# Patient Record
Sex: MALE | Race: BLACK OR AFRICAN AMERICAN | NOT HISPANIC OR LATINO | URBAN - METROPOLITAN AREA
[De-identification: names, ages, dates, MRNs, and addresses within clinical notes are randomized per-mention and may not be internally consistent; named-entity substitution may affect disease eponyms.]

---

## 2018-05-11 ENCOUNTER — OFFICE (AMBULATORY)
Dept: URBAN - METROPOLITAN AREA CLINIC 75 | Facility: CLINIC | Age: 53
End: 2018-05-11
Payer: COMMERCIAL

## 2018-05-11 VITALS
SYSTOLIC BLOOD PRESSURE: 142 MMHG | DIASTOLIC BLOOD PRESSURE: 82 MMHG | WEIGHT: 173 LBS | HEIGHT: 67 IN | HEART RATE: 90 BPM

## 2018-05-11 DIAGNOSIS — K21.9 GASTRO-ESOPHAGEAL REFLUX DISEASE WITHOUT ESOPHAGITIS: ICD-10-CM

## 2018-05-11 DIAGNOSIS — R07.9 CHEST PAIN, UNSPECIFIED: ICD-10-CM

## 2018-05-11 PROCEDURE — 99203 OFFICE O/P NEW LOW 30 MIN: CPT | Performed by: INTERNAL MEDICINE

## 2019-04-02 VITALS
TEMPERATURE: 96.6 F | SYSTOLIC BLOOD PRESSURE: 127 MMHG | DIASTOLIC BLOOD PRESSURE: 61 MMHG | HEART RATE: 100 BPM | SYSTOLIC BLOOD PRESSURE: 122 MMHG | HEART RATE: 91 BPM | DIASTOLIC BLOOD PRESSURE: 89 MMHG | TEMPERATURE: 96.5 F | RESPIRATION RATE: 12 BRPM | SYSTOLIC BLOOD PRESSURE: 132 MMHG | RESPIRATION RATE: 13 BRPM | SYSTOLIC BLOOD PRESSURE: 130 MMHG | HEART RATE: 75 BPM | SYSTOLIC BLOOD PRESSURE: 158 MMHG | SYSTOLIC BLOOD PRESSURE: 145 MMHG | RESPIRATION RATE: 16 BRPM | DIASTOLIC BLOOD PRESSURE: 80 MMHG | DIASTOLIC BLOOD PRESSURE: 88 MMHG | HEART RATE: 90 BPM | WEIGHT: 163 LBS | HEART RATE: 78 BPM | HEART RATE: 83 BPM | HEART RATE: 88 BPM | OXYGEN SATURATION: 100 % | DIASTOLIC BLOOD PRESSURE: 74 MMHG | DIASTOLIC BLOOD PRESSURE: 86 MMHG | HEART RATE: 104 BPM | HEIGHT: 67 IN | OXYGEN SATURATION: 96 % | SYSTOLIC BLOOD PRESSURE: 150 MMHG | OXYGEN SATURATION: 98 % | OXYGEN SATURATION: 40 %

## 2019-04-04 ENCOUNTER — AMBULATORY SURGICAL CENTER (AMBULATORY)
Dept: URBAN - METROPOLITAN AREA SURGERY 17 | Facility: SURGERY | Age: 54
End: 2019-04-04
Payer: COMMERCIAL

## 2019-04-04 ENCOUNTER — OFFICE (AMBULATORY)
Dept: URBAN - METROPOLITAN AREA PATHOLOGY 4 | Facility: PATHOLOGY | Age: 54
End: 2019-04-04
Payer: COMMERCIAL

## 2019-04-04 DIAGNOSIS — K31.89 OTHER DISEASES OF STOMACH AND DUODENUM: ICD-10-CM

## 2019-04-04 DIAGNOSIS — K29.80 DUODENITIS WITHOUT BLEEDING: ICD-10-CM

## 2019-04-04 DIAGNOSIS — K21.9 GASTRO-ESOPHAGEAL REFLUX DISEASE WITHOUT ESOPHAGITIS: ICD-10-CM

## 2019-04-04 LAB
GI HISTOLOGY: A. SELECT: (no result)
GI HISTOLOGY: PDF REPORT: (no result)

## 2019-04-04 PROCEDURE — 43239 EGD BIOPSY SINGLE/MULTIPLE: CPT | Performed by: INTERNAL MEDICINE

## 2019-04-04 PROCEDURE — 88305 TISSUE EXAM BY PATHOLOGIST: CPT | Performed by: INTERNAL MEDICINE

## 2021-05-11 ENCOUNTER — OFFICE VISIT (OUTPATIENT)
Dept: FAMILY MEDICINE CLINIC | Facility: CLINIC | Age: 56
End: 2021-05-11

## 2021-05-11 VITALS
DIASTOLIC BLOOD PRESSURE: 76 MMHG | OXYGEN SATURATION: 98 % | HEART RATE: 87 BPM | BODY MASS INDEX: 23.3 KG/M2 | HEIGHT: 66 IN | SYSTOLIC BLOOD PRESSURE: 126 MMHG | WEIGHT: 145 LBS

## 2021-05-11 DIAGNOSIS — G89.29 CHRONIC LEFT-SIDED LOW BACK PAIN WITH LEFT-SIDED SCIATICA: ICD-10-CM

## 2021-05-11 DIAGNOSIS — M17.12 PRIMARY OSTEOARTHRITIS OF LEFT KNEE: ICD-10-CM

## 2021-05-11 DIAGNOSIS — M54.42 CHRONIC LEFT-SIDED LOW BACK PAIN WITH LEFT-SIDED SCIATICA: ICD-10-CM

## 2021-05-11 DIAGNOSIS — Z00.00 MEDICARE ANNUAL WELLNESS VISIT, SUBSEQUENT: Primary | ICD-10-CM

## 2021-05-11 DIAGNOSIS — M54.16 LUMBAR RADICULOPATHY: ICD-10-CM

## 2021-05-11 DIAGNOSIS — F32.1 CURRENT MODERATE EPISODE OF MAJOR DEPRESSIVE DISORDER WITHOUT PRIOR EPISODE (HCC): ICD-10-CM

## 2021-05-11 DIAGNOSIS — M43.13 SPONDYLOLISTHESIS OF CERVICOTHORACIC REGION: ICD-10-CM

## 2021-05-11 DIAGNOSIS — E78.5 DYSLIPIDEMIA: ICD-10-CM

## 2021-05-11 PROBLEM — M25.562 BILATERAL CHRONIC KNEE PAIN: Status: ACTIVE | Noted: 2021-05-11

## 2021-05-11 PROBLEM — S12.9XXA PSEUDOARTHROSIS OF CERVICAL SPINE: Status: ACTIVE | Noted: 2019-04-15

## 2021-05-11 PROBLEM — S83.249A MEDIAL MENISCUS TEAR: Status: ACTIVE | Noted: 2019-12-19

## 2021-05-11 PROBLEM — R10.13 EPIGASTRIC PAIN: Status: ACTIVE | Noted: 2020-03-12

## 2021-05-11 PROBLEM — I10 ESSENTIAL HYPERTENSION: Status: ACTIVE | Noted: 2019-03-05

## 2021-05-11 PROBLEM — M50.20 CERVICAL DISC HERNIATION: Status: ACTIVE | Noted: 2017-02-15

## 2021-05-11 PROBLEM — M25.561 BILATERAL CHRONIC KNEE PAIN: Status: ACTIVE | Noted: 2021-05-11

## 2021-05-11 PROBLEM — Z83.719 FAMILY HISTORY OF COLONIC POLYPS: Status: ACTIVE | Noted: 2019-12-27

## 2021-05-11 PROBLEM — K21.9 GERD (GASTROESOPHAGEAL REFLUX DISEASE): Status: ACTIVE | Noted: 2021-05-11

## 2021-05-11 PROBLEM — M54.50 CHRONIC LOW BACK PAIN: Status: ACTIVE | Noted: 2021-05-11

## 2021-05-11 PROBLEM — N40.1 BPH WITH OBSTRUCTION/LOWER URINARY TRACT SYMPTOMS: Status: ACTIVE | Noted: 2019-08-07

## 2021-05-11 PROBLEM — N13.8 BPH WITH OBSTRUCTION/LOWER URINARY TRACT SYMPTOMS: Status: ACTIVE | Noted: 2019-08-07

## 2021-05-11 PROBLEM — G47.33 OSA ON CPAP: Status: ACTIVE | Noted: 2019-11-19

## 2021-05-11 PROBLEM — F32.A DEPRESSION: Status: ACTIVE | Noted: 2021-05-11

## 2021-05-11 PROBLEM — Z99.89 OSA ON CPAP: Status: ACTIVE | Noted: 2019-11-19

## 2021-05-11 PROBLEM — R94.39 ABNORMAL NUCLEAR STRESS TEST: Status: ACTIVE | Noted: 2018-06-05

## 2021-05-11 PROBLEM — Z98.1 S/P CERVICAL SPINAL FUSION: Status: ACTIVE | Noted: 2019-03-11

## 2021-05-11 PROBLEM — Z83.71 FAMILY HISTORY OF COLONIC POLYPS: Status: ACTIVE | Noted: 2019-12-27

## 2021-05-11 PROBLEM — M54.2 CHRONIC NECK PAIN: Status: ACTIVE | Noted: 2021-05-11

## 2021-05-11 PROBLEM — M47.816 FACET ARTHRITIS OF LUMBAR REGION: Status: ACTIVE | Noted: 2019-10-25

## 2021-05-11 PROCEDURE — 1159F MED LIST DOCD IN RCRD: CPT | Performed by: FAMILY MEDICINE

## 2021-05-11 PROCEDURE — 99203 OFFICE O/P NEW LOW 30 MIN: CPT | Performed by: FAMILY MEDICINE

## 2021-05-11 PROCEDURE — 1125F AMNT PAIN NOTED PAIN PRSNT: CPT | Performed by: FAMILY MEDICINE

## 2021-05-11 PROCEDURE — G0439 PPPS, SUBSEQ VISIT: HCPCS | Performed by: FAMILY MEDICINE

## 2021-05-11 PROCEDURE — 1170F FXNL STATUS ASSESSED: CPT | Performed by: FAMILY MEDICINE

## 2021-05-11 RX ORDER — VENLAFAXINE HYDROCHLORIDE 75 MG/1
75 CAPSULE, EXTENDED RELEASE ORAL DAILY
COMMUNITY
Start: 2020-11-24 | End: 2021-05-11 | Stop reason: SDUPTHER

## 2021-05-11 RX ORDER — MELOXICAM 15 MG/1
1 TABLET ORAL DAILY
COMMUNITY
Start: 2021-04-20 | End: 2021-08-02

## 2021-05-11 RX ORDER — GABAPENTIN 300 MG/1
1 CAPSULE ORAL 4 TIMES DAILY
COMMUNITY
Start: 2020-12-23 | End: 2021-08-02

## 2021-05-11 RX ORDER — VENLAFAXINE HYDROCHLORIDE 150 MG/1
150 CAPSULE, EXTENDED RELEASE ORAL DAILY
Qty: 30 CAPSULE | Refills: 5 | Status: SHIPPED | OUTPATIENT
Start: 2021-05-11 | End: 2022-09-12 | Stop reason: SDUPTHER

## 2021-05-11 RX ORDER — HYDROCODONE BITARTRATE AND ACETAMINOPHEN 10; 325 MG/1; MG/1
2 TABLET ORAL 4 TIMES DAILY
COMMUNITY
Start: 2021-04-29 | End: 2022-09-12

## 2021-05-11 RX ORDER — LIDOCAINE 50 MG/G
1 PATCH TOPICAL EVERY 24 HOURS
Qty: 30 PATCH | Refills: 12 | Status: SHIPPED | OUTPATIENT
Start: 2021-05-11 | End: 2022-09-12 | Stop reason: SDUPTHER

## 2021-05-21 ENCOUNTER — TELEPHONE (OUTPATIENT)
Dept: FAMILY MEDICINE CLINIC | Facility: CLINIC | Age: 56
End: 2021-05-21

## 2021-05-21 NOTE — TELEPHONE ENCOUNTER
Could you look at the office notes on 5/11/21, Hub keeps sending referral back because they have not been signed yet. Thank you. 5/21/21 TS

## 2021-06-21 RX ORDER — EPINEPHRINE 0.3 MG/.3ML
0.3 INJECTION SUBCUTANEOUS ONCE
Qty: 1 EACH | Refills: 1 | Status: SHIPPED | OUTPATIENT
Start: 2021-06-21 | End: 2021-06-21

## 2021-06-21 NOTE — TELEPHONE ENCOUNTER
Caller: Kaiser Grier    Relationship: Self    Best call back number: [phone]     Medication needed:   PATIENT IS REQUESTING A NEW EPI PEN. 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     When do you need the refill by: 21      What is the patient's preferred pharmacy: [facility] [address] KAYLEE [facility]RA  [phone] [facility] [phone] FX

## 2021-06-22 ENCOUNTER — TELEPHONE (OUTPATIENT)
Dept: FAMILY MEDICINE CLINIC | Facility: CLINIC | Age: 56
End: 2021-06-22

## 2021-06-22 DIAGNOSIS — G89.29 CHRONIC LEFT-SIDED LOW BACK PAIN WITH LEFT-SIDED SCIATICA: Primary | ICD-10-CM

## 2021-06-22 DIAGNOSIS — M54.16 LUMBAR RADICULOPATHY: ICD-10-CM

## 2021-06-22 DIAGNOSIS — M54.42 CHRONIC LEFT-SIDED LOW BACK PAIN WITH LEFT-SIDED SCIATICA: Primary | ICD-10-CM

## 2021-06-22 DIAGNOSIS — M43.13 SPONDYLOLISTHESIS OF CERVICOTHORACIC REGION: ICD-10-CM

## 2021-06-22 NOTE — TELEPHONE ENCOUNTER
Caller: Kaiser Grier    Relationship: Self    Best call back number: [phone]    What specialty or service is being requested: PAIN MANAGEMENT       Any additional details: PATIENT IS ASKING FOR A REFERRAL TO A DIFFERENT PAIN MANAGEMENT FACILITY THAN THE ONE HE IS ATTENDING NOW ([facility]).  PATIENT STATES THAT HE NEEDS TO CONTINUE TO RECEIVE THE SHOTS IN HIS BACK, BUT SHOTS THAT  HE IS GETTING NOW ARE NOT WORKING. PATIENT BELIEVES IT IS A DIFFERENT/WEAKER MEDICATION.    PLEASE ADVISE

## 2021-08-02 ENCOUNTER — OFFICE VISIT (OUTPATIENT)
Dept: FAMILY MEDICINE CLINIC | Facility: CLINIC | Age: 56
End: 2021-08-02

## 2021-08-02 VITALS
WEIGHT: 142 LBS | DIASTOLIC BLOOD PRESSURE: 62 MMHG | OXYGEN SATURATION: 99 % | BODY MASS INDEX: 22.82 KG/M2 | SYSTOLIC BLOOD PRESSURE: 122 MMHG | HEART RATE: 56 BPM | HEIGHT: 66 IN

## 2021-08-02 DIAGNOSIS — K21.9 GASTROESOPHAGEAL REFLUX DISEASE, UNSPECIFIED WHETHER ESOPHAGITIS PRESENT: ICD-10-CM

## 2021-08-02 DIAGNOSIS — R11.0 NAUSEA: ICD-10-CM

## 2021-08-02 DIAGNOSIS — M17.0 PRIMARY OSTEOARTHRITIS OF BOTH KNEES: ICD-10-CM

## 2021-08-02 DIAGNOSIS — M43.13 SPONDYLOLISTHESIS OF CERVICOTHORACIC REGION: Primary | ICD-10-CM

## 2021-08-02 DIAGNOSIS — Z79.1 NSAID LONG-TERM USE: ICD-10-CM

## 2021-08-02 PROCEDURE — 99213 OFFICE O/P EST LOW 20 MIN: CPT | Performed by: FAMILY MEDICINE

## 2021-08-02 RX ORDER — EPINEPHRINE 0.3 MG/.3ML
INJECTION SUBCUTANEOUS
COMMUNITY
Start: 2021-06-21 | End: 2022-12-12 | Stop reason: SDUPTHER

## 2021-08-02 RX ORDER — METHYLPREDNISOLONE ACETATE 80 MG/ML
80 INJECTION, SUSPENSION INTRA-ARTICULAR; INTRALESIONAL; INTRAMUSCULAR; SOFT TISSUE ONCE
Status: COMPLETED | OUTPATIENT
Start: 2021-08-02 | End: 2021-08-02

## 2021-08-02 RX ORDER — TRAZODONE HYDROCHLORIDE 50 MG/1
1 TABLET ORAL NIGHTLY PRN
COMMUNITY
Start: 2021-07-27 | End: 2021-11-08

## 2021-08-02 RX ORDER — KETOROLAC TROMETHAMINE 30 MG/ML
60 INJECTION, SOLUTION INTRAMUSCULAR; INTRAVENOUS ONCE
Status: COMPLETED | OUTPATIENT
Start: 2021-08-02 | End: 2021-08-02

## 2021-08-02 RX ORDER — PROMETHAZINE HYDROCHLORIDE 12.5 MG/1
12.5 TABLET ORAL EVERY 8 HOURS PRN
Qty: 24 TABLET | Refills: 2 | Status: SHIPPED | OUTPATIENT
Start: 2021-08-02 | End: 2022-10-10 | Stop reason: SDUPTHER

## 2021-08-02 RX ORDER — GABAPENTIN 600 MG/1
600 TABLET ORAL 3 TIMES DAILY
Qty: 90 TABLET | Refills: 5 | Status: SHIPPED | OUTPATIENT
Start: 2021-08-02 | End: 2022-09-12 | Stop reason: SDUPTHER

## 2021-08-02 RX ORDER — OMEPRAZOLE 40 MG/1
40 CAPSULE, DELAYED RELEASE ORAL DAILY
Qty: 30 CAPSULE | Refills: 5 | Status: SHIPPED | OUTPATIENT
Start: 2021-08-02 | End: 2021-11-08

## 2021-08-02 RX ORDER — DICLOFENAC SODIUM 75 MG/1
75 TABLET, DELAYED RELEASE ORAL 2 TIMES DAILY
Qty: 60 TABLET | Refills: 0 | Status: SHIPPED | OUTPATIENT
Start: 2021-08-02 | End: 2022-09-12

## 2021-08-02 RX ADMIN — KETOROLAC TROMETHAMINE 60 MG: 30 INJECTION, SOLUTION INTRAMUSCULAR; INTRAVENOUS at 15:30

## 2021-08-02 RX ADMIN — METHYLPREDNISOLONE ACETATE 80 MG: 80 INJECTION, SUSPENSION INTRA-ARTICULAR; INTRALESIONAL; INTRAMUSCULAR; SOFT TISSUE at 15:30

## 2021-08-02 NOTE — PROGRESS NOTES
Subjective   Kaiser Grier is a 56 y.o. male. Presents today for   Chief Complaint   Patient presents with   • Anxiety   • hep c screening       Back Pain  This is a chronic problem. The current episode started more than 1 year ago. The problem occurs constantly. The problem has been rapidly worsening since onset. The pain is present in the sacro-iliac. The quality of the pain is described as aching, burning, shooting and stabbing. The pain radiates to the left foot, left knee and left thigh. The pain is at a severity of 8/10. The pain is the same all the time. The symptoms are aggravated by bending, position, lying down, sitting, standing, stress and twisting. Stiffness is present in the morning and at night. Associated symptoms include abdominal pain, headaches, leg pain, numbness, paresthesias, tingling and weakness. Pertinent negatives include no bladder incontinence, bowel incontinence, chest pain, dysuria, fever, paresis, pelvic pain, perianal numbness or weight loss.   Having stiffness; especially getting out of car.       Review of Systems   Constitutional: Negative for fever and weight loss.   Cardiovascular: Negative for chest pain.   Gastrointestinal: Positive for abdominal pain. Negative for bowel incontinence.   Genitourinary: Negative for bladder incontinence, dysuria and pelvic pain.   Musculoskeletal: Positive for back pain.   Neurological: Positive for tingling, weakness, numbness, headaches and paresthesias.       Patient Active Problem List   Diagnosis   • Abnormal nuclear stress test   • Arthritis of left knee   • Bilateral chronic knee pain   • BPH with obstruction/lower urinary tract symptoms   • Cervical disc herniation   • Chronic low back pain   • Chronic neck pain   • Depression   • Dyslipidemia   • Epigastric pain   • Essential hypertension   • Facet arthritis of lumbar region   • Family history of colonic polyps   • GERD (gastroesophageal reflux disease)   • Medial meniscus tear   • MARIA DOLORES on  "CPAP   • Primary osteoarthritis of left knee   • Pseudoarthrosis of cervical spine (CMS/HCC)   • S/P cervical spinal fusion   • Spondylolisthesis of cervicothoracic region       Social History     Socioeconomic History   • Marital status:      Spouse name: Not on file   • Number of children: Not on file   • Years of education: Not on file   • Highest education level: Not on file   Tobacco Use   • Smoking status: Former Smoker     Packs/day: 0.75     Types: Cigarettes     Start date: 1981     Quit date: 6/10/2005     Years since quittin.1   • Smokeless tobacco: Never Used   Substance and Sexual Activity   • Alcohol use: Never   • Drug use: Yes     Types: Marijuana       Allergies   Allergen Reactions   • Fish-Derived Products Anaphylaxis   • Shellfish-Derived Products Anaphylaxis   • Trazodone Other (See Comments)     Jittery       Current Outpatient Medications on File Prior to Visit   Medication Sig Dispense Refill   • HYDROcodone-acetaminophen (NORCO)  MG per tablet Take 2 tablets by mouth 4 (Four) Times a Day.     • lidocaine (LIDODERM) 5 % Place 1 patch on the skin as directed by provider Daily. Remove & Discard patch within 12 hours or as directed by MD 30 patch 12   • traZODone (DESYREL) 50 MG tablet Take 1 tablet by mouth At Night As Needed.     • venlafaxine XR (EFFEXOR-XR) 150 MG 24 hr capsule Take 1 capsule by mouth Daily. 30 capsule 5   • EPINEPHrine (EPIPEN) 0.3 MG/0.3ML solution auto-injector injection        No current facility-administered medications on file prior to visit.       Objective   Vitals:    21 1449   BP: 122/62   Pulse: 56   SpO2: 99%   Weight: 64.4 kg (142 lb)   Height: 167.6 cm (66\")     Body mass index is 22.92 kg/m².    Physical Exam  Vitals and nursing note reviewed.   Constitutional:       Appearance: He is well-developed.   Musculoskeletal:      Cervical back: Neck supple.   Skin:     General: Skin is warm and dry.   Neurological:      Mental Status: He " is alert.   Psychiatric:         Behavior: Behavior normal.         Assessment/Plan   Diagnoses and all orders for this visit:    1. Spondylolisthesis of cervicothoracic region (Primary)  -     Diclofenac Sodium (VOLTAREN) 1 % gel gel; Apply 4 g topically to the appropriate area as directed 4 (Four) Times a Day As Needed (knee pain, back pain).  Dispense: 100 g; Refill: 5  -     diclofenac (VOLTAREN) 75 MG EC tablet; Take 1 tablet by mouth 2 (Two) Times a Day.  Dispense: 60 tablet; Refill: 0  -     gabapentin (Neurontin) 600 MG tablet; Take 1 tablet by mouth 3 (Three) Times a Day.  Dispense: 90 tablet; Refill: 5  -     ketorolac (TORADOL) injection 60 mg  -     methylPREDNISolone acetate (DEPO-medrol) injection 80 mg    2. Gastroesophageal reflux disease, unspecified whether esophagitis present    3. NSAID long-term use  -     omeprazole (priLOSEC) 40 MG capsule; Take 1 capsule by mouth Daily.  Dispense: 30 capsule; Refill: 5  -     Comprehensive Metabolic Panel    4. Nausea  -     promethazine (PHENERGAN) 12.5 MG tablet; Take 1 tablet by mouth Every 8 (Eight) Hours As Needed for Nausea. Uses sparingly  Dispense: 24 tablet; Refill: 2    5. Primary osteoarthritis of both knees  -     diclofenac (VOLTAREN) 75 MG EC tablet; Take 1 tablet by mouth 2 (Two) Times a Day.  Dispense: 60 tablet; Refill: 0  -     ketorolac (TORADOL) injection 60 mg  -     methylPREDNISolone acetate (DEPO-medrol) injection 80 mg      Injections for above to se eif helps settle  Reports would like phenergan and called for referral, I don't see a message;  Refilled, use sparingly  On longterm nsaid - take omeprazole as noted above  Increase gabapentin to 600mg tid;  Change nsaids to see if can add better relief.         -Follow up: 3 months and prn

## 2021-08-03 LAB
ALBUMIN SERPL-MCNC: 4.3 G/DL (ref 3.8–4.9)
ALBUMIN/GLOB SERPL: 1.6 {RATIO} (ref 1.2–2.2)
ALP SERPL-CCNC: 50 IU/L (ref 48–121)
ALT SERPL-CCNC: 10 IU/L (ref 0–44)
AST SERPL-CCNC: 19 IU/L (ref 0–40)
BILIRUB SERPL-MCNC: 0.5 MG/DL (ref 0–1.2)
BUN SERPL-MCNC: 17 MG/DL (ref 6–24)
BUN/CREAT SERPL: 15 (ref 9–20)
CALCIUM SERPL-MCNC: 9.5 MG/DL (ref 8.7–10.2)
CHLORIDE SERPL-SCNC: 103 MMOL/L (ref 96–106)
CO2 SERPL-SCNC: 24 MMOL/L (ref 20–29)
CREAT SERPL-MCNC: 1.15 MG/DL (ref 0.76–1.27)
GLOBULIN SER CALC-MCNC: 2.7 G/DL (ref 1.5–4.5)
GLUCOSE SERPL-MCNC: 93 MG/DL (ref 65–99)
POTASSIUM SERPL-SCNC: 5.1 MMOL/L (ref 3.5–5.2)
PROT SERPL-MCNC: 7 G/DL (ref 6–8.5)
SODIUM SERPL-SCNC: 141 MMOL/L (ref 134–144)

## 2021-08-04 ENCOUNTER — TELEPHONE (OUTPATIENT)
Dept: FAMILY MEDICINE CLINIC | Facility: CLINIC | Age: 56
End: 2021-08-04

## 2021-08-04 DIAGNOSIS — M54.42 CHRONIC LEFT-SIDED LOW BACK PAIN WITH LEFT-SIDED SCIATICA: Primary | ICD-10-CM

## 2021-08-04 DIAGNOSIS — G89.29 CHRONIC LEFT-SIDED LOW BACK PAIN WITH LEFT-SIDED SCIATICA: Primary | ICD-10-CM

## 2021-08-04 RX ORDER — PREDNISONE 10 MG/1
TABLET ORAL
Qty: 32 TABLET | Refills: 0 | Status: SHIPPED | OUTPATIENT
Start: 2021-08-04 | End: 2022-03-30

## 2021-08-04 RX ORDER — METHOCARBAMOL 750 MG/1
750 TABLET, FILM COATED ORAL 3 TIMES DAILY PRN
Qty: 90 TABLET | Refills: 3 | Status: SHIPPED | OUTPATIENT
Start: 2021-08-04 | End: 2022-09-12 | Stop reason: SDUPTHER

## 2021-08-04 NOTE — TELEPHONE ENCOUNTER
I sent in methocarbamol and prednisone taper.  I sent the other day diclofenac but hold until prednisone complete then start.   See if this helps.  I did send the other day a larger dose of gabapentin to try. RRJ    Please advise.

## 2021-08-04 NOTE — TELEPHONE ENCOUNTER
PATIENT CALLING TO SEE IF DR.JOHNSON CALLED IN ANYTHING TO HELP WITH PAIN HE STATED THE SHOTS ARE NOT HELPING.    PLEASE ADVISE  [phone]

## 2021-08-04 NOTE — TELEPHONE ENCOUNTER
Caller: Kaiser Grier    Relationship: Self    Best call back number: [phone]     What is the best time to reach you: ANY     Who are you requesting to speak with (clinical staff, provider,  specific staff member): DR. DE LA PAZ     Do you know the name of the person who called: N/A    What was the call regarding: SHOTS THAT WERE GIVEN IN HIP FOR HIS BACK PATIENT STATED THEY AREN'T WORKING .     Do you require a callback: YES

## 2021-09-16 ENCOUNTER — OFFICE VISIT (OUTPATIENT)
Dept: FAMILY MEDICINE CLINIC | Facility: CLINIC | Age: 56
End: 2021-09-16

## 2021-09-16 ENCOUNTER — TELEPHONE (OUTPATIENT)
Dept: FAMILY MEDICINE CLINIC | Facility: CLINIC | Age: 56
End: 2021-09-16

## 2021-09-16 ENCOUNTER — HOSPITAL ENCOUNTER (OUTPATIENT)
Dept: CARDIOLOGY | Facility: HOSPITAL | Age: 56
Discharge: HOME | End: 2021-09-16

## 2021-09-16 VITALS
DIASTOLIC BLOOD PRESSURE: 64 MMHG | WEIGHT: 144 LBS | HEIGHT: 66 IN | OXYGEN SATURATION: 97 % | HEART RATE: 71 BPM | SYSTOLIC BLOOD PRESSURE: 126 MMHG | BODY MASS INDEX: 23.14 KG/M2

## 2021-09-16 DIAGNOSIS — R94.39 EQUIVOCAL STRESS TEST: ICD-10-CM

## 2021-09-16 DIAGNOSIS — R20.0 LEFT ARM NUMBNESS: ICD-10-CM

## 2021-09-16 DIAGNOSIS — Z82.49 FAMILY HISTORY OF EARLY CAD: ICD-10-CM

## 2021-09-16 DIAGNOSIS — R07.2 PRECORDIAL PAIN: Primary | ICD-10-CM

## 2021-09-16 DIAGNOSIS — R07.2 PRECORDIAL PAIN: ICD-10-CM

## 2021-09-16 DIAGNOSIS — R07.89 OTHER CHEST PAIN: Primary | ICD-10-CM

## 2021-09-16 LAB
ALBUMIN SERPL-MCNC: 4.4 G/DL (ref 3.5–5.2)
ALBUMIN/GLOB SERPL: 1.7 G/DL
ALP SERPL-CCNC: 47 U/L (ref 39–117)
ALT SERPL W P-5'-P-CCNC: 8 U/L (ref 1–41)
ANION GAP SERPL CALCULATED.3IONS-SCNC: 11.7 MMOL/L (ref 5–15)
AST SERPL-CCNC: 16 U/L (ref 1–40)
BASOPHILS # BLD AUTO: 0.08 10*3/MM3 (ref 0–0.2)
BASOPHILS NFR BLD AUTO: 1.2 % (ref 0–1.5)
BH CV STRESS BP STAGE 1: NORMAL
BH CV STRESS BP STAGE 2: NORMAL
BH CV STRESS BP STAGE 3: NORMAL
BH CV STRESS DURATION MIN STAGE 1: 3
BH CV STRESS DURATION MIN STAGE 2: 3
BH CV STRESS DURATION MIN STAGE 3: 3
BH CV STRESS DURATION MIN STAGE 4: 1
BH CV STRESS DURATION SEC STAGE 1: 0
BH CV STRESS DURATION SEC STAGE 2: 0
BH CV STRESS DURATION SEC STAGE 3: 0
BH CV STRESS DURATION SEC STAGE 4: 17
BH CV STRESS GRADE STAGE 1: 10
BH CV STRESS GRADE STAGE 2: 12
BH CV STRESS GRADE STAGE 3: 14
BH CV STRESS GRADE STAGE 4: 16
BH CV STRESS HR STAGE 1: 92
BH CV STRESS HR STAGE 2: 106
BH CV STRESS HR STAGE 3: 120
BH CV STRESS HR STAGE 4: 129
BH CV STRESS METS STAGE 1: 5
BH CV STRESS METS STAGE 2: 7.5
BH CV STRESS METS STAGE 3: 10
BH CV STRESS METS STAGE 4: 13.5
BH CV STRESS PROTOCOL 1: NORMAL
BH CV STRESS RECOVERY BP: NORMAL MMHG
BH CV STRESS SPEED STAGE 1: 1.7
BH CV STRESS SPEED STAGE 2: 2.5
BH CV STRESS SPEED STAGE 3: 3.4
BH CV STRESS SPEED STAGE 4: 4.2
BH CV STRESS STAGE 1: 1
BH CV STRESS STAGE 2: 2
BH CV STRESS STAGE 3: 3
BH CV STRESS STAGE 4: 4
BILIRUB SERPL-MCNC: 0.3 MG/DL (ref 0–1.2)
BUN SERPL-MCNC: 12 MG/DL (ref 6–20)
BUN/CREAT SERPL: 11.7 (ref 7–25)
CALCIUM SPEC-SCNC: 8.9 MG/DL (ref 8.6–10.5)
CHLORIDE SERPL-SCNC: 102 MMOL/L (ref 98–107)
CO2 SERPL-SCNC: 25.3 MMOL/L (ref 22–29)
CREAT SERPL-MCNC: 1.03 MG/DL (ref 0.76–1.27)
D DIMER PPP FEU-MCNC: 0.45 MCGFEU/ML (ref 0–0.49)
DEPRECATED RDW RBC AUTO: 47.3 FL (ref 37–54)
EOSINOPHIL # BLD AUTO: 0.17 10*3/MM3 (ref 0–0.4)
EOSINOPHIL NFR BLD AUTO: 2.6 % (ref 0.3–6.2)
ERYTHROCYTE [DISTWIDTH] IN BLOOD BY AUTOMATED COUNT: 12.9 % (ref 12.3–15.4)
GFR SERPL CREATININE-BSD FRML MDRD: 74 ML/MIN/1.73
GLOBULIN UR ELPH-MCNC: 2.6 GM/DL
GLUCOSE SERPL-MCNC: 80 MG/DL (ref 65–99)
HCT VFR BLD AUTO: 37.2 % (ref 37.5–51)
HGB BLD-MCNC: 12.1 G/DL (ref 13–17.7)
IMM GRANULOCYTES # BLD AUTO: 0.02 10*3/MM3 (ref 0–0.05)
IMM GRANULOCYTES NFR BLD AUTO: 0.3 % (ref 0–0.5)
LYMPHOCYTES # BLD AUTO: 2.7 10*3/MM3 (ref 0.7–3.1)
LYMPHOCYTES NFR BLD AUTO: 41.6 % (ref 19.6–45.3)
MAXIMAL PREDICTED HEART RATE: 163 BPM
MCH RBC QN AUTO: 32.3 PG (ref 26.6–33)
MCHC RBC AUTO-ENTMCNC: 32.5 G/DL (ref 31.5–35.7)
MCV RBC AUTO: 99.2 FL (ref 79–97)
MONOCYTES # BLD AUTO: 0.56 10*3/MM3 (ref 0.1–0.9)
MONOCYTES NFR BLD AUTO: 8.6 % (ref 5–12)
NEUTROPHILS NFR BLD AUTO: 2.96 10*3/MM3 (ref 1.7–7)
NEUTROPHILS NFR BLD AUTO: 45.7 % (ref 42.7–76)
NRBC BLD AUTO-RTO: 0 /100 WBC (ref 0–0.2)
NT-PROBNP SERPL-MCNC: 77.8 PG/ML (ref 0–900)
PERCENT MAX PREDICTED HR: 79.14 %
PLATELET # BLD AUTO: 219 10*3/MM3 (ref 140–450)
PMV BLD AUTO: 8.7 FL (ref 6–12)
POTASSIUM SERPL-SCNC: 4.4 MMOL/L (ref 3.5–5.2)
PROT SERPL-MCNC: 7 G/DL (ref 6–8.5)
RBC # BLD AUTO: 3.75 10*6/MM3 (ref 4.14–5.8)
SODIUM SERPL-SCNC: 139 MMOL/L (ref 136–145)
STRESS BASELINE BP: NORMAL MMHG
STRESS BASELINE HR: 71 BPM
STRESS PERCENT HR: 93 %
STRESS POST EXERCISE DUR MIN: 10 MIN
STRESS POST PEAK BP: NORMAL MMHG
STRESS POST PEAK HR: 129 BPM
STRESS TARGET HR: 139 BPM
TROPONIN T SERPL-MCNC: <0.01 NG/ML (ref 0–0.03)
WBC # BLD AUTO: 6.49 10*3/MM3 (ref 3.4–10.8)

## 2021-09-16 PROCEDURE — 99204 OFFICE O/P NEW MOD 45 MIN: CPT | Performed by: INTERNAL MEDICINE

## 2021-09-16 PROCEDURE — 94760 N-INVAS EAR/PLS OXIMETRY 1: CPT

## 2021-09-16 PROCEDURE — 85025 COMPLETE CBC W/AUTO DIFF WBC: CPT

## 2021-09-16 PROCEDURE — 93016 CV STRESS TEST SUPVJ ONLY: CPT | Performed by: INTERNAL MEDICINE

## 2021-09-16 PROCEDURE — 93017 CV STRESS TEST TRACING ONLY: CPT

## 2021-09-16 PROCEDURE — 93018 CV STRESS TEST I&R ONLY: CPT | Performed by: INTERNAL MEDICINE

## 2021-09-16 PROCEDURE — 93000 ELECTROCARDIOGRAM COMPLETE: CPT | Performed by: FAMILY MEDICINE

## 2021-09-16 PROCEDURE — 80053 COMPREHEN METABOLIC PANEL: CPT

## 2021-09-16 PROCEDURE — 83880 ASSAY OF NATRIURETIC PEPTIDE: CPT | Performed by: INTERNAL MEDICINE

## 2021-09-16 PROCEDURE — 85379 FIBRIN DEGRADATION QUANT: CPT | Performed by: INTERNAL MEDICINE

## 2021-09-16 PROCEDURE — 36415 COLL VENOUS BLD VENIPUNCTURE: CPT

## 2021-09-16 PROCEDURE — 99214 OFFICE O/P EST MOD 30 MIN: CPT | Performed by: FAMILY MEDICINE

## 2021-09-16 PROCEDURE — 84484 ASSAY OF TROPONIN QUANT: CPT | Performed by: INTERNAL MEDICINE

## 2021-09-16 RX ORDER — MELOXICAM 15 MG/1
TABLET ORAL
COMMUNITY
Start: 2021-08-24 | End: 2022-09-12 | Stop reason: SDUPTHER

## 2021-09-16 RX ORDER — SODIUM CHLORIDE 0.9 % (FLUSH) 0.9 %
10 SYRINGE (ML) INJECTION AS NEEDED
Status: SHIPPED | OUTPATIENT
Start: 2021-09-16

## 2021-09-16 RX ORDER — NITROGLYCERIN 0.4 MG/1
0.4 TABLET SUBLINGUAL
Status: SHIPPED | OUTPATIENT
Start: 2021-09-16

## 2021-09-16 NOTE — TELEPHONE ENCOUNTER
Caller: Kaiser Grier    Relationship: Self    Best call back number:[phone]     What is the best time to reach you: ANYTIME    Who are you requesting to speak with (clinical staff, provider,  specific staff member): CLINICAL    What was the call regarding:    NUMBNESS IN LEFT ARM  UNKNOWN PAIN IN CHEST    Do you require a callback:REFERRED TO CLINIC

## 2021-09-16 NOTE — PROGRESS NOTES
Date of Hospital Visit: 21  Encounter Provider: Kahlil Mcmanus MD  Place of Service: [facility]  Patient Name: Kaiser Grier  :1965  Referral Provider: Keaton Bach DO    Chief complaint: chest pain, arm numbness    History of Present Illness    Mr. Grier is a 57yo man who I am seeing in consultation in [facility] for chest pian and arm numbness.     He was seen by [facility] in 2018 for chest pain; a treadmill perfusion stress test was performed. He exercised for 11 minutes without EKG changes. However, his images suggested basal inferolateral ischemia. He went on to have a cardiac cath in 2018 that was unremarkable.    He has had extensive cervical spine surgery.  He reports numbness of the entire left arm that is present at almost all times.  He can use the arm and is not weak or painful.  He has had intermittent chest discomfort in the left chest that is nonradiating, nonpleuritic, and is not associated with diaphoresis, clamminess, nausea, or shortness of breath.  He does not experience chest pain or shortness of breath with working.  The symptoms predominantly occur at rest and last for a few minutes.  He worries because his father and sister had significant heart disease.    He has not had palpitations, lightheadedness, syncope, or leg swelling.    Past Medical History:   Diagnosis Date   • Anxiety    • BPH with obstruction/lower urinary tract symptoms 2019   • Cervical disc herniation 2/15/2017   • Chronic low back pain 2021   • False positive cardiac stress test     , essentially normal coronaries   • GERD (gastroesophageal reflux disease) 2021   • Hypertension    • MARIA DOLORES on CPAP 2019       Past Surgical History:   Procedure Laterality Date   • JOINT REPLACEMENT Left     KNEE   • SPINE SURGERY         Prior to Admission medications    Medication Sig Start Date End Date Taking? Authorizing Provider   diclofenac  (VOLTAREN) 75 MG EC tablet Take 1 tablet by mouth 2 (Two) Times a Day. 8/2/21   Keaton Bach DO   Diclofenac Sodium (VOLTAREN) 1 % gel gel Apply 4 g topically to the appropriate area as directed 4 (Four) Times a Day As Needed (knee pain, back pain). 8/2/21   Keaton Bach DO   EPINEPHrine (EPIPEN) 0.3 MG/0.3ML solution auto-injector injection  6/21/21   Lorraine Ruiz MD   gabapentin (Neurontin) 600 MG tablet Take 1 tablet by mouth 3 (Three) Times a Day. 8/2/21   Keaton Bach DO   HYDROcodone-acetaminophen (NORCO)  MG per tablet Take 2 tablets by mouth 4 (Four) Times a Day. 4/29/21   Lorraine Ruiz MD   lidocaine (LIDODERM) 5 % Place 1 patch on the skin as directed by provider Daily. Remove & Discard patch within 12 hours or as directed by MD 5/11/21   Keaton Bach DO   meloxicam (MOBIC) 15 MG tablet  8/24/21   Lorraine Ruiz MD   methocarbamol (ROBAXIN) 750 MG tablet Take 1 tablet by mouth 3 (Three) Times a Day As Needed for Muscle Spasms. 8/4/21   Keaton Bach DO   omeprazole (priLOSEC) 40 MG capsule Take 1 capsule by mouth Daily. 8/2/21   Keaton Bach DO   predniSONE (DELTASONE) 10 MG tablet 6 tabs po qd x 2 days, 4 tabs po qd x 2 days, 3 tabs po qd x 2 days, 2 tabs po qd x 2 days, 1 tab po qd x 2 days 8/4/21   Keaton Bach DO   promethazine (PHENERGAN) 12.5 MG tablet Take 1 tablet by mouth Every 8 (Eight) Hours As Needed for Nausea. Uses sparingly 8/2/21   Keaton Bach DO   traZODone (DESYREL) 50 MG tablet Take 1 tablet by mouth At Night As Needed. 7/27/21   Lorraine Ruiz MD   venlafaxine XR (EFFEXOR-XR) 150 MG 24 hr capsule Take 1 capsule by mouth Daily. 5/11/21   Keaton Bach DO       Social History     Socioeconomic History   • Marital status:      Spouse name: Not on file   • Number of children: Not on file   • Years of education: Not on file   • Highest education level: Not on file   Tobacco Use   • Smoking  status: Former Smoker     Packs/day: 0.75     Types: Cigarettes     Start date: 1981     Quit date: 6/10/2005     Years since quittin.2   • Smokeless tobacco: Never Used   Substance and Sexual Activity   • Alcohol use: Never   • Drug use: Yes     Types: Marijuana       Family History   Problem Relation Age of Onset   • Heart disease Mother    • Hypertension Mother    • Dementia Mother    • Alzheimer's disease Mother    • Heart disease Father    • Hypertension Father    • Diabetes Sister    • Diabetes Brother    • Dementia Maternal Grandmother    • Alzheimer's disease Maternal Grandmother    • Dementia Sister        Review of Systems   Cardiovascular: Positive for chest pain.   Neurological: Positive for numbness.   All other systems reviewed and are negative.       Objective:   There were no vitals filed for this visit.  There is no height or weight on file to calculate BMI.      Physical Exam  Vitals reviewed.   Constitutional:       General: He is not in acute distress.     Appearance: He is well-developed.   HENT:      Head: Normocephalic.      Nose: Nose normal.      Mouth/Throat:      Comments: masked  Eyes:      Conjunctiva/sclera: Conjunctivae normal.   Neck:      Vascular: No JVD.   Cardiovascular:      Rate and Rhythm: Normal rate and regular rhythm.      Heart sounds: Normal heart sounds.   Pulmonary:      Effort: Pulmonary effort is normal.      Breath sounds: Normal breath sounds.   Abdominal:      Palpations: Abdomen is soft.      Tenderness: There is no abdominal tenderness.   Musculoskeletal:         General: Normal range of motion.      Cervical back: Normal range of motion.   Skin:     General: Skin is warm and dry.      Findings: No erythema.   Neurological:      General: No focal deficit present.      Mental Status: He is alert and oriented to person, place, and time.      Cranial Nerves: No cranial nerve deficit.      Comments: + paresthesias LUE, moves well   Psychiatric:         Mood  and Affect: Mood normal.         Behavior: Behavior normal.         Thought Content: Thought content normal.                 Lab Review:                Results from last 7 days   Lab Units 09/16/21  1512   SODIUM mmol/L 139   POTASSIUM mmol/L 4.4   CHLORIDE mmol/L 102   CO2 mmol/L 25.3   BUN mg/dL 12   CREATININE mg/dL 1.03   GLUCOSE mg/dL 80   CALCIUM mg/dL 8.9     Results from last 7 days   Lab Units 09/16/21  1512   TROPONIN T ng/mL <0.010     Results from last 7 days   Lab Units 09/16/21  1512   WBC 10*3/mm3 6.49   HEMOGLOBIN g/dL 12.1*   HEMATOCRIT % 37.2*   PLATELETS 10*3/mm3 219         No results found for: TSH  Lab Results   Component Value Date    DDIMERQUANT 0.45 09/16/2021     Lab Results   Component Value Date    PROBNP 77.8 09/16/2021       Procedures    EKG --   I have personally reviewed EKG on 09/16/2021 and my interpretation of the tracing is as follows: NSR, normal EKG, no change from prior      Assessment/Plan:     1. Precordial pain    2. Left arm numbness      Overall, his chest pain is fairly atypical. Also, he had an unremarkable cath in 2018. His Tn, Dimer, proBNP, and EKG are normal. I put him on the treadmill today, and he exercised for 10.5 minutes without chest pain, but had to stop at 79% of his age predicted maximum due to fatigue. However, there were no EKG changes. Overall, this is a low risk finding, but given his family history, we really do need to obtain a diagnostic stress test. I'll bring him back for a perfusion stress test. It's too late to get that done today.     His left arm paresthesia involves the entire arm and is constant.  This is radicular, not cardiac. I recommend he follow up with his neurosurgeon who has done his C-spine surgery in the past.

## 2021-09-16 NOTE — PROGRESS NOTES
Subjective   Kaiser Grier is a 56 y.o. male. Presents today for   Chief Complaint   Patient presents with   • Chest Pain       Chest Pain   This is a new problem. The current episode started 1 to 4 weeks ago. The onset quality is sudden. The problem occurs intermittently. The problem has been waxing and waning. The pain is present in the substernal region. The pain is moderate. Radiates to: left arm numb;   Pertinent negatives include no exertional chest pressure, lower extremity edema, orthopnea, palpitations, PND or shortness of breath. The pain is aggravated by nothing. Treatments tried: tums tried few weeks ago though helped, but not today;  started earlier today;   Hsa resolved. The treatment provided no relief. Risk factors include male gender.   His past medical history is significant for MI (reports was told AMI in 2000, but told no damage;  Do not have records.  ).   Pertinent negatives for past medical history include no PE, no sleep apnea, no stimulant use, no strokes and no thyroid problem.   His family medical history is significant for CAD (Sister had AMI), heart disease and early MI (Father CABG x3). Prior workup: Doesn;t recall if heart cath remote past.         Review of Systems   Respiratory: Negative for shortness of breath.    Cardiovascular: Positive for chest pain. Negative for palpitations, orthopnea and PND.       Patient Active Problem List   Diagnosis   • Abnormal nuclear stress test   • Arthritis of left knee   • Bilateral chronic knee pain   • BPH with obstruction/lower urinary tract symptoms   • Cervical disc herniation   • Chronic low back pain   • Chronic neck pain   • Depression   • Dyslipidemia   • Epigastric pain   • Essential hypertension   • Facet arthritis of lumbar region   • Family history of colonic polyps   • GERD (gastroesophageal reflux disease)   • Medial meniscus tear   • MARIA DOLORES on CPAP   • Primary osteoarthritis of left knee   • Pseudoarthrosis of cervical spine (CMS/HCC)    • S/P cervical spinal fusion   • Spondylolisthesis of cervicothoracic region       Social History     Socioeconomic History   • Marital status:      Spouse name: Not on file   • Number of children: Not on file   • Years of education: Not on file   • Highest education level: Not on file   Tobacco Use   • Smoking status: Former Smoker     Packs/day: 0.75     Types: Cigarettes     Start date: 1981     Quit date: 6/10/2005     Years since quittin.2   • Smokeless tobacco: Never Used   Substance and Sexual Activity   • Alcohol use: Never   • Drug use: Yes     Types: Marijuana       Allergies   Allergen Reactions   • Fish-Derived Products Anaphylaxis   • Shellfish-Derived Products Anaphylaxis   • Trazodone Other (See Comments)     Jittery       Current Outpatient Medications on File Prior to Visit   Medication Sig Dispense Refill   • diclofenac (VOLTAREN) 75 MG EC tablet Take 1 tablet by mouth 2 (Two) Times a Day. 60 tablet 0   • Diclofenac Sodium (VOLTAREN) 1 % gel gel Apply 4 g topically to the appropriate area as directed 4 (Four) Times a Day As Needed (knee pain, back pain). 100 g 5   • EPINEPHrine (EPIPEN) 0.3 MG/0.3ML solution auto-injector injection      • gabapentin (Neurontin) 600 MG tablet Take 1 tablet by mouth 3 (Three) Times a Day. 90 tablet 5   • HYDROcodone-acetaminophen (NORCO)  MG per tablet Take 2 tablets by mouth 4 (Four) Times a Day.     • lidocaine (LIDODERM) 5 % Place 1 patch on the skin as directed by provider Daily. Remove & Discard patch within 12 hours or as directed by MD 30 patch 12   • methocarbamol (ROBAXIN) 750 MG tablet Take 1 tablet by mouth 3 (Three) Times a Day As Needed for Muscle Spasms. 90 tablet 3   • omeprazole (priLOSEC) 40 MG capsule Take 1 capsule by mouth Daily. 30 capsule 5   • predniSONE (DELTASONE) 10 MG tablet 6 tabs po qd x 2 days, 4 tabs po qd x 2 days, 3 tabs po qd x 2 days, 2 tabs po qd x 2 days, 1 tab po qd x 2 days 32 tablet 0   • promethazine  "(PHENERGAN) 12.5 MG tablet Take 1 tablet by mouth Every 8 (Eight) Hours As Needed for Nausea. Uses sparingly 24 tablet 2   • traZODone (DESYREL) 50 MG tablet Take 1 tablet by mouth At Night As Needed.     • venlafaxine XR (EFFEXOR-XR) 150 MG 24 hr capsule Take 1 capsule by mouth Daily. 30 capsule 5   • meloxicam (MOBIC) 15 MG tablet        No current facility-administered medications on file prior to visit.       Objective   Vitals:    09/16/21 1309   BP: 126/64   Pulse: 71   SpO2: 97%   Weight: 65.3 kg (144 lb)   Height: 167.6 cm (65.98\")     Body mass index is 23.25 kg/m².    Physical Exam  Vitals and nursing note reviewed.   Constitutional:       Appearance: He is well-developed.   HENT:      Head: Normocephalic and atraumatic.   Neck:      Thyroid: No thyromegaly.      Vascular: No JVD.   Cardiovascular:      Rate and Rhythm: Normal rate and regular rhythm.      Heart sounds: No murmur heard.   No friction rub. Gallop present. S4 sounds present.    Pulmonary:      Effort: Pulmonary effort is normal. No respiratory distress.      Breath sounds: Normal breath sounds. No wheezing or rales.   Abdominal:      General: Bowel sounds are normal. There is no distension.      Palpations: Abdomen is soft.      Tenderness: There is no abdominal tenderness. There is no guarding or rebound.   Musculoskeletal:      Cervical back: Neck supple.   Skin:     General: Skin is warm and dry.   Neurological:      Mental Status: He is alert.   Psychiatric:         Behavior: Behavior normal.           ECG 12 Lead - chest pain    Date/Time: 9/16/2021 1:23 PM  Performed by: Keaton Bach DO  Authorized by: Keaton Bach DO   Comparison: not compared with previous ECG   Previous ECG: no previous ECG available  Rhythm: sinus bradycardia  Rate: bradycardic  BPM: 56  Conduction: conduction normal  Conduction comments: QRS 86 ms  QTc 386 ms  T flattening: aVL  QRS axis: normal  Other findings: non-specific ST-T wave changes  Clinical " impression comment: 1) Sinus bradycardia            Assessment/Plan   Diagnoses and all orders for this visit:    1. Other chest pain (Primary)  -     ECG 12 Lead - chest pain    2. Family history of early CAD    patient with reported ami ? 2000, do not have records;  Has had off/on chest pain with strong family hx.  Recommend rule out.   Contacted chest pain center and will take;  Gave map, copy of EKG and directed to remain NPO, head straight there.           -Follow up: after chest pain center.

## 2021-09-16 NOTE — TELEPHONE ENCOUNTER
I advise pt to go to ER but he refused. I advised him that if he gets short of breath, slurred speech to call 911 and go to ER. He said the reason he doesn't want to go to ER is because of COVID and he doesn't want to sit there forever.

## 2021-09-17 ENCOUNTER — HOSPITAL ENCOUNTER (OUTPATIENT)
Dept: CARDIOLOGY | Facility: HOSPITAL | Age: 56
Discharge: HOME | End: 2021-09-17
Admitting: INTERNAL MEDICINE

## 2021-09-17 VITALS — HEIGHT: 66 IN | WEIGHT: 144 LBS | BODY MASS INDEX: 23.14 KG/M2

## 2021-09-17 DIAGNOSIS — R94.39 EQUIVOCAL STRESS TEST: ICD-10-CM

## 2021-09-17 DIAGNOSIS — R07.2 PRECORDIAL PAIN: ICD-10-CM

## 2021-09-17 DIAGNOSIS — R20.0 LEFT ARM NUMBNESS: ICD-10-CM

## 2021-09-17 LAB
BH CV NUCLEAR PRIOR STUDY: 3
BH CV REST NUCLEAR ISOTOPE DOSE: 10.9 MCI
BH CV STRESS BP STAGE 1: NORMAL
BH CV STRESS COMMENTS STAGE 1: NORMAL
BH CV STRESS DOSE REGADENOSON STAGE 1: 0.4
BH CV STRESS DURATION MIN STAGE 1: 0
BH CV STRESS DURATION SEC STAGE 1: 10
BH CV STRESS HR STAGE 1: 103
BH CV STRESS NUCLEAR ISOTOPE DOSE: 33.7 MCI
BH CV STRESS PROTOCOL 1: NORMAL
BH CV STRESS RECOVERY BP: NORMAL MMHG
BH CV STRESS RECOVERY HR: 73 BPM
BH CV STRESS STAGE 1: 1
LV EF NUC BP: 51 %
MAXIMAL PREDICTED HEART RATE: 163 BPM
PERCENT MAX PREDICTED HR: 63.19 %
STRESS BASELINE BP: NORMAL MMHG
STRESS BASELINE HR: 58 BPM
STRESS PERCENT HR: 74 %
STRESS POST EXERCISE DUR MIN: 0 MIN
STRESS POST EXERCISE DUR SEC: 10 SEC
STRESS POST PEAK BP: NORMAL MMHG
STRESS POST PEAK HR: 103 BPM
STRESS TARGET HR: 139 BPM

## 2021-09-17 PROCEDURE — 93016 CV STRESS TEST SUPVJ ONLY: CPT | Performed by: INTERNAL MEDICINE

## 2021-09-17 PROCEDURE — 93018 CV STRESS TEST I&R ONLY: CPT | Performed by: INTERNAL MEDICINE

## 2021-09-17 PROCEDURE — 78452 HT MUSCLE IMAGE SPECT MULT: CPT | Performed by: INTERNAL MEDICINE

## 2021-09-17 PROCEDURE — A9502 TC99M TETROFOSMIN: HCPCS | Performed by: INTERNAL MEDICINE

## 2021-09-17 PROCEDURE — 93017 CV STRESS TEST TRACING ONLY: CPT

## 2021-09-17 PROCEDURE — 78452 HT MUSCLE IMAGE SPECT MULT: CPT

## 2021-09-17 PROCEDURE — 0 TECHNETIUM TETROFOSMIN KIT: Performed by: INTERNAL MEDICINE

## 2021-09-17 PROCEDURE — 25010000002 REGADENOSON 0.4 MG/5ML SOLUTION: Performed by: INTERNAL MEDICINE

## 2021-09-17 RX ADMIN — REGADENOSON 0.4 MG: 0.08 INJECTION, SOLUTION INTRAVENOUS at 13:40

## 2021-09-17 RX ADMIN — TETROFOSMIN 1 DOSE: 1.38 INJECTION, POWDER, LYOPHILIZED, FOR SOLUTION INTRAVENOUS at 12:25

## 2021-09-17 RX ADMIN — TETROFOSMIN 1 DOSE: 1.38 INJECTION, POWDER, LYOPHILIZED, FOR SOLUTION INTRAVENOUS at 13:40

## 2021-09-17 NOTE — PROGRESS NOTES
Please give him the good news that his stress test is completely normal.  I want him to follow-up with his primary care physician.

## 2021-09-17 NOTE — ADDENDUM NOTE
Encounter addended by: Clotilde Kunz RN on: 9/17/2021 8:17 AM   Actions taken: LDA properties accepted, Flowsheet accepted

## 2021-11-08 ENCOUNTER — OFFICE VISIT (OUTPATIENT)
Dept: FAMILY MEDICINE CLINIC | Facility: CLINIC | Age: 56
End: 2021-11-08

## 2021-11-08 VITALS
HEIGHT: 66 IN | BODY MASS INDEX: 22.5 KG/M2 | SYSTOLIC BLOOD PRESSURE: 118 MMHG | OXYGEN SATURATION: 100 % | DIASTOLIC BLOOD PRESSURE: 62 MMHG | WEIGHT: 140 LBS | HEART RATE: 73 BPM

## 2021-11-08 DIAGNOSIS — R11.0 NAUSEA: ICD-10-CM

## 2021-11-08 DIAGNOSIS — D53.9 MACROCYTIC ANEMIA: ICD-10-CM

## 2021-11-08 DIAGNOSIS — R53.83 OTHER FATIGUE: ICD-10-CM

## 2021-11-08 DIAGNOSIS — R10.13 EPIGASTRIC ABDOMINAL PAIN: Primary | ICD-10-CM

## 2021-11-08 PROBLEM — Z87.891 PERSONAL HISTORY OF NICOTINE DEPENDENCE: Status: ACTIVE | Noted: 2020-06-19

## 2021-11-08 PROBLEM — Z47.1 AFTERCARE FOLLOWING JOINT REPLACEMENT SURGERY: Status: ACTIVE | Noted: 2020-06-17

## 2021-11-08 PROBLEM — Z96.652 PRESENCE OF LEFT ARTIFICIAL KNEE JOINT: Status: ACTIVE | Noted: 2020-06-17

## 2021-11-08 PROBLEM — G62.9 POLYNEUROPATHY, UNSPECIFIED: Status: ACTIVE | Noted: 2020-06-19

## 2021-11-08 PROBLEM — Z48.02 ENCOUNTER FOR REMOVAL OF SUTURES: Status: ACTIVE | Noted: 2020-06-19

## 2021-11-08 PROBLEM — Z91.81 HISTORY OF FALLING: Status: ACTIVE | Noted: 2020-06-19

## 2021-11-08 PROCEDURE — 99214 OFFICE O/P EST MOD 30 MIN: CPT | Performed by: FAMILY MEDICINE

## 2021-11-08 RX ORDER — PANTOPRAZOLE SODIUM 40 MG/1
40 TABLET, DELAYED RELEASE ORAL DAILY
Qty: 90 TABLET | Refills: 3 | Status: SHIPPED | OUTPATIENT
Start: 2021-11-08 | End: 2022-09-12 | Stop reason: SDUPTHER

## 2021-11-08 NOTE — PROGRESS NOTES
Subjective   Kaiser Grier is a 56 y.o. male. Presents today for   Chief Complaint   Patient presents with   • Follow-up     3 month   • Back Pain   • Abdominal Pain       Patient here for f/u of chest pain;  Had sent to chest pain center and had low risk stress test.  Reports no further chest pain;  On labs mild anemia, looking at past labs has had ongoing anemia.  Reports chronic epigastri pain and is on nsaids long-term.  He reports off ppi for several weeks at least.  He is having fatigue, myalgias and tingling.      Back Pain  Associated symptoms include abdominal pain.   Abdominal Pain  This is a chronic problem. The onset quality is sudden. The problem occurs constantly. The problem has been waxing and waning. The pain is located in the epigastric region. The pain is moderate. The quality of the pain is aching and burning. The abdominal pain radiates to the back. Associated symptoms include nausea. Pertinent negatives include no constipation, diarrhea, hematochezia, melena or vomiting. Treatments tried: ppi in past. The treatment provided no relief (off ppi for some time now, thinks may have helped; ). Prior workup: reports had EGD in past, but told ok.       Review of Systems   Gastrointestinal: Positive for abdominal pain and nausea. Negative for constipation, diarrhea, hematochezia, melena and vomiting.   Musculoskeletal: Positive for back pain.       Patient Active Problem List   Diagnosis   • Arthritis of left knee   • Bilateral chronic knee pain   • BPH with obstruction/lower urinary tract symptoms   • Cervical disc herniation   • Chronic low back pain   • Chronic neck pain   • Depression   • Dyslipidemia   • Epigastric pain   • Essential hypertension   • Facet arthritis of lumbar region   • Family history of colonic polyps   • GERD (gastroesophageal reflux disease)   • Medial meniscus tear   • MARIA DOLORES on CPAP   • Primary osteoarthritis of left knee   • Pseudoarthrosis of cervical spine (HCC)   • S/P  cervical spinal fusion   • Spondylolisthesis of cervicothoracic region   • Aftercare following joint replacement surgery   • Encounter for removal of sutures   • History of falling   • Major depressive disorder, single episode, unspecified   • Personal history of nicotine dependence   • Polyneuropathy, unspecified   • Presence of left artificial knee joint       Social History     Socioeconomic History   • Marital status:    Tobacco Use   • Smoking status: Former Smoker     Packs/day: 0.75     Types: Cigarettes     Start date: 1981     Quit date: 6/10/2005     Years since quittin.4   • Smokeless tobacco: Never Used   Substance and Sexual Activity   • Alcohol use: Never   • Drug use: Yes     Types: Marijuana       Allergies   Allergen Reactions   • Fish-Derived Products Anaphylaxis   • Shellfish-Derived Products Anaphylaxis   • Trazodone Other (See Comments)     Jittery       Current Outpatient Medications on File Prior to Visit   Medication Sig Dispense Refill   • diclofenac (VOLTAREN) 75 MG EC tablet Take 1 tablet by mouth 2 (Two) Times a Day. 60 tablet 0   • Diclofenac Sodium (VOLTAREN) 1 % gel gel Apply 4 g topically to the appropriate area as directed 4 (Four) Times a Day As Needed (knee pain, back pain). 100 g 5   • EPINEPHrine (EPIPEN) 0.3 MG/0.3ML solution auto-injector injection      • gabapentin (Neurontin) 600 MG tablet Take 1 tablet by mouth 3 (Three) Times a Day. 90 tablet 5   • HYDROcodone-acetaminophen (NORCO)  MG per tablet Take 2 tablets by mouth 4 (Four) Times a Day.     • lidocaine (LIDODERM) 5 % Place 1 patch on the skin as directed by provider Daily. Remove & Discard patch within 12 hours or as directed by MD 30 patch 12   • meloxicam (MOBIC) 15 MG tablet      • methocarbamol (ROBAXIN) 750 MG tablet Take 1 tablet by mouth 3 (Three) Times a Day As Needed for Muscle Spasms. 90 tablet 3   • predniSONE (DELTASONE) 10 MG tablet 6 tabs po qd x 2 days, 4 tabs po qd x 2 days, 3 tabs  "po qd x 2 days, 2 tabs po qd x 2 days, 1 tab po qd x 2 days 32 tablet 0   • promethazine (PHENERGAN) 12.5 MG tablet Take 1 tablet by mouth Every 8 (Eight) Hours As Needed for Nausea. Uses sparingly 24 tablet 2   • venlafaxine XR (EFFEXOR-XR) 150 MG 24 hr capsule Take 1 capsule by mouth Daily. 30 capsule 5   • [DISCONTINUED] omeprazole (priLOSEC) 40 MG capsule Take 1 capsule by mouth Daily. 30 capsule 5   • [DISCONTINUED] traZODone (DESYREL) 50 MG tablet Take 1 tablet by mouth At Night As Needed.       Current Facility-Administered Medications on File Prior to Visit   Medication Dose Route Frequency Provider Last Rate Last Admin   • nitroglycerin (NITROSTAT) SL tablet 0.4 mg  0.4 mg Sublingual Q5 Min PRN Kahlil Mcmanus MD       • sodium chloride 0.9 % flush 10 mL  10 mL Intravenous PRN Kahlil Mcmanus MD           Objective   Vitals:    11/08/21 1338   BP: 118/62   Pulse: 73   SpO2: 100%   Weight: 63.5 kg (140 lb)   Height: 167.6 cm (66\")   PainSc: 0-No pain     Body mass index is 22.6 kg/m².    Physical Exam  Vitals and nursing note reviewed.   Constitutional:       Appearance: He is well-developed.   HENT:      Head: Normocephalic and atraumatic.   Neck:      Thyroid: No thyromegaly.      Vascular: No JVD.   Cardiovascular:      Rate and Rhythm: Normal rate and regular rhythm.      Heart sounds: Normal heart sounds. No murmur heard.  No friction rub. No gallop.    Pulmonary:      Effort: Pulmonary effort is normal. No respiratory distress.      Breath sounds: Normal breath sounds. No wheezing or rales.   Abdominal:      General: Bowel sounds are normal. There is no distension.      Palpations: Abdomen is soft.      Tenderness: There is abdominal tenderness in the epigastric area. There is no guarding or rebound.   Musculoskeletal:      Cervical back: Neck supple.   Skin:     General: Skin is warm and dry.   Neurological:      Mental Status: He is alert.   Psychiatric:         Behavior: Behavior normal. "         Assessment/Plan   Diagnoses and all orders for this visit:    1. Epigastric abdominal pain (Primary)  -     H. Pylori Breath Test - Breath, Lung  -     Lipase  -     Amylase  -     pantoprazole (PROTONIX) 40 MG EC tablet; Take 1 tablet by mouth Daily.  Dispense: 90 tablet; Refill: 3    2. Nausea  -     H. Pylori Breath Test - Breath, Lung  -     pantoprazole (PROTONIX) 40 MG EC tablet; Take 1 tablet by mouth Daily.  Dispense: 90 tablet; Refill: 3    3. Other fatigue  -     Comprehensive Metabolic Panel  -     CBC & Differential  -     Vitamin B12  -     TSH    4. Macrocytic anemia  -     CBC & Differential  -     Ferritin  -     Folate  -     KATRINA,PE and FLC, Serum  -     Iron Profile  -     Vitamin B12  -     TSH    since off ppi for a while, will check h pylori breath test.    Had anemia in past, is a little better but possibly symptomatic and will check labs;  Would consider possible endoscopy again as on chronic nsaids, but states done in past.  Will see if can find copy  Will start ppi again  Ok order b12 if low, patient relates asked for in past.  I do not see in [facility] if checked.            -Follow up: 3 months andp rn

## 2021-11-09 LAB
ALBUMIN SERPL ELPH-MCNC: 3.9 G/DL (ref 2.9–4.4)
ALBUMIN SERPL-MCNC: 4.5 G/DL (ref 3.8–4.9)
ALBUMIN/GLOB SERPL: 1.3 {RATIO} (ref 0.7–1.7)
ALBUMIN/GLOB SERPL: 1.8 {RATIO} (ref 1.2–2.2)
ALP SERPL-CCNC: 55 IU/L (ref 44–121)
ALPHA1 GLOB SERPL ELPH-MCNC: 0.2 G/DL (ref 0–0.4)
ALPHA2 GLOB SERPL ELPH-MCNC: 0.6 G/DL (ref 0.4–1)
ALT SERPL-CCNC: 11 IU/L (ref 0–44)
AMYLASE SERPL-CCNC: 90 U/L (ref 31–110)
AST SERPL-CCNC: 15 IU/L (ref 0–40)
B-GLOBULIN SERPL ELPH-MCNC: 0.9 G/DL (ref 0.7–1.3)
BASOPHILS # BLD AUTO: 0.1 X10E3/UL (ref 0–0.2)
BASOPHILS NFR BLD AUTO: 2 %
BILIRUB SERPL-MCNC: 0.4 MG/DL (ref 0–1.2)
BUN SERPL-MCNC: 12 MG/DL (ref 6–24)
BUN/CREAT SERPL: 11 (ref 9–20)
CALCIUM SERPL-MCNC: 9.2 MG/DL (ref 8.7–10.2)
CHLORIDE SERPL-SCNC: 103 MMOL/L (ref 96–106)
CO2 SERPL-SCNC: 25 MMOL/L (ref 20–29)
CREAT SERPL-MCNC: 1.09 MG/DL (ref 0.76–1.27)
EOSINOPHIL # BLD AUTO: 0.2 X10E3/UL (ref 0–0.4)
EOSINOPHIL NFR BLD AUTO: 3 %
ERYTHROCYTE [DISTWIDTH] IN BLOOD BY AUTOMATED COUNT: 12.9 % (ref 11.6–15.4)
FERRITIN SERPL-MCNC: 46 NG/ML (ref 30–400)
FOLATE SERPL-MCNC: 8.2 NG/ML
GAMMA GLOB SERPL ELPH-MCNC: 1.4 G/DL (ref 0.4–1.8)
GLOBULIN SER CALC-MCNC: 2.5 G/DL (ref 1.5–4.5)
GLOBULIN SER-MCNC: 3.1 G/DL (ref 2.2–3.9)
GLUCOSE SERPL-MCNC: 63 MG/DL (ref 65–99)
HCT VFR BLD AUTO: 39.3 % (ref 37.5–51)
HGB BLD-MCNC: 13 G/DL (ref 13–17.7)
IGA SERPL-MCNC: 155 MG/DL (ref 90–386)
IGG SERPL-MCNC: 1513 MG/DL (ref 603–1613)
IGM SERPL-MCNC: 39 MG/DL (ref 20–172)
IMM GRANULOCYTES # BLD AUTO: 0 X10E3/UL (ref 0–0.1)
IMM GRANULOCYTES NFR BLD AUTO: 0 %
INTERPRETATION SERPL IEP-IMP: ABNORMAL
IRON SATN MFR SERPL: 36 % (ref 15–55)
IRON SERPL-MCNC: 87 UG/DL (ref 38–169)
KAPPA LC FREE SER-MCNC: 28.2 MG/L (ref 3.3–19.4)
KAPPA LC FREE/LAMBDA FREE SER: 1.54 {RATIO} (ref 0.26–1.65)
LABORATORY COMMENT REPORT: ABNORMAL
LAMBDA LC FREE SERPL-MCNC: 18.3 MG/L (ref 5.7–26.3)
LIPASE SERPL-CCNC: 19 U/L (ref 13–78)
LYMPHOCYTES # BLD AUTO: 2.8 X10E3/UL (ref 0.7–3.1)
LYMPHOCYTES NFR BLD AUTO: 46 %
M PROTEIN SERPL ELPH-MCNC: 0.5 G/DL
MCH RBC QN AUTO: 32.8 PG (ref 26.6–33)
MCHC RBC AUTO-ENTMCNC: 33.1 G/DL (ref 31.5–35.7)
MCV RBC AUTO: 99 FL (ref 79–97)
MONOCYTES # BLD AUTO: 0.5 X10E3/UL (ref 0.1–0.9)
MONOCYTES NFR BLD AUTO: 8 %
NEUTROPHILS # BLD AUTO: 2.4 X10E3/UL (ref 1.4–7)
NEUTROPHILS NFR BLD AUTO: 41 %
PLATELET # BLD AUTO: 282 X10E3/UL (ref 150–450)
POTASSIUM SERPL-SCNC: 4.3 MMOL/L (ref 3.5–5.2)
PROT SERPL-MCNC: 7 G/DL (ref 6–8.5)
RBC # BLD AUTO: 3.96 X10E6/UL (ref 4.14–5.8)
SODIUM SERPL-SCNC: 140 MMOL/L (ref 134–144)
TIBC SERPL-MCNC: 240 UG/DL (ref 250–450)
TSH SERPL DL<=0.005 MIU/L-ACNC: 1.71 UIU/ML (ref 0.45–4.5)
UIBC SERPL-MCNC: 153 UG/DL (ref 111–343)
UREA BREATH TEST QL: NEGATIVE
VIT B12 SERPL-MCNC: 435 PG/ML (ref 232–1245)
WBC # BLD AUTO: 6 X10E3/UL (ref 3.4–10.8)

## 2021-11-13 DIAGNOSIS — D47.2 MGUS (MONOCLONAL GAMMOPATHY OF UNKNOWN SIGNIFICANCE): Primary | ICD-10-CM

## 2021-11-13 NOTE — PROGRESS NOTES
Call results to patient.  Patient has M Andre on labs.  This may just be nonspecific and is very low on measurement.  However, there is a type of bone cancer that can do this and so to sure, I recommend we send to hematology to evaluate as well.  I also recommend skeletal survery at hospital.  Can go to [facility] xray for this.  Orders placed.  Kidney, liver and pancreatic function normal  H. Pylori was negative

## 2021-11-15 ENCOUNTER — HOSPITAL ENCOUNTER (OUTPATIENT)
Dept: GENERAL RADIOLOGY | Facility: HOSPITAL | Age: 56
Discharge: HOME | End: 2021-11-15
Admitting: FAMILY MEDICINE

## 2021-11-15 DIAGNOSIS — D47.2 GAMMOPATHY WITH MULTIPLE M SPIKES: Primary | ICD-10-CM

## 2021-11-15 PROCEDURE — 77075 RADEX OSSEOUS SURVEY COMPL: CPT

## 2021-11-16 NOTE — PROGRESS NOTES
Call results to patient.  Skeletal survey negative for any bony lesions.  See hematology as planned

## 2021-11-24 ENCOUNTER — LAB (OUTPATIENT)
Dept: OTHER | Facility: HOSPITAL | Age: 56
End: 2021-11-24

## 2021-11-24 ENCOUNTER — CONSULT (OUTPATIENT)
Dept: ONCOLOGY | Facility: CLINIC | Age: 56
End: 2021-11-24

## 2021-11-24 VITALS
OXYGEN SATURATION: 99 % | WEIGHT: 142.8 LBS | HEART RATE: 64 BPM | TEMPERATURE: 98 F | DIASTOLIC BLOOD PRESSURE: 70 MMHG | SYSTOLIC BLOOD PRESSURE: 157 MMHG | RESPIRATION RATE: 16 BRPM | BODY MASS INDEX: 22.41 KG/M2 | HEIGHT: 67 IN

## 2021-11-24 DIAGNOSIS — D47.2 MGUS (MONOCLONAL GAMMOPATHY OF UNKNOWN SIGNIFICANCE): Primary | ICD-10-CM

## 2021-11-24 DIAGNOSIS — D47.2 MONOCLONAL GAMMOPATHY: Primary | ICD-10-CM

## 2021-11-24 LAB
BASOPHILS # BLD AUTO: 0.05 10*3/MM3 (ref 0–0.2)
BASOPHILS NFR BLD AUTO: 0.9 % (ref 0–1.5)
DEPRECATED RDW RBC AUTO: 46.1 FL (ref 37–54)
EOSINOPHIL # BLD AUTO: 0.19 10*3/MM3 (ref 0–0.4)
EOSINOPHIL NFR BLD AUTO: 3.4 % (ref 0.3–6.2)
ERYTHROCYTE [DISTWIDTH] IN BLOOD BY AUTOMATED COUNT: 12.8 % (ref 12.3–15.4)
HCT VFR BLD AUTO: 36.5 % (ref 37.5–51)
HGB BLD-MCNC: 11.8 G/DL (ref 13–17.7)
IMM GRANULOCYTES # BLD AUTO: 0.02 10*3/MM3 (ref 0–0.05)
IMM GRANULOCYTES NFR BLD AUTO: 0.4 % (ref 0–0.5)
LYMPHOCYTES # BLD AUTO: 2.3 10*3/MM3 (ref 0.7–3.1)
LYMPHOCYTES NFR BLD AUTO: 41.6 % (ref 19.6–45.3)
MCH RBC QN AUTO: 31.9 PG (ref 26.6–33)
MCHC RBC AUTO-ENTMCNC: 32.3 G/DL (ref 31.5–35.7)
MCV RBC AUTO: 98.6 FL (ref 79–97)
MONOCYTES # BLD AUTO: 0.42 10*3/MM3 (ref 0.1–0.9)
MONOCYTES NFR BLD AUTO: 7.6 % (ref 5–12)
NEUTROPHILS NFR BLD AUTO: 2.55 10*3/MM3 (ref 1.7–7)
NEUTROPHILS NFR BLD AUTO: 46.1 % (ref 42.7–76)
NRBC BLD AUTO-RTO: 0 /100 WBC (ref 0–0.2)
PLATELET # BLD AUTO: 240 10*3/MM3 (ref 140–450)
PMV BLD AUTO: 9.3 FL (ref 6–12)
RBC # BLD AUTO: 3.7 10*6/MM3 (ref 4.14–5.8)
WBC NRBC COR # BLD: 5.53 10*3/MM3 (ref 3.4–10.8)

## 2021-11-24 PROCEDURE — 36415 COLL VENOUS BLD VENIPUNCTURE: CPT

## 2021-11-24 PROCEDURE — 85025 COMPLETE CBC W/AUTO DIFF WBC: CPT | Performed by: INTERNAL MEDICINE

## 2021-11-24 PROCEDURE — 99205 OFFICE O/P NEW HI 60 MIN: CPT | Performed by: INTERNAL MEDICINE

## 2021-11-24 NOTE — PROGRESS NOTES
[facility]    CONSULTING IN BLOOD DISORDERS & CANCER      REASON FOR CONSULTATION/CHIEF COMPLAINT:     Evaluation & management for MGUS                             REQUESTING PHYSICIAN: Keaton Bach DO  RECORDS OBTAINED:  Records of the patients history including those from the electronic medical record were reviewed and summarized in detail.    HISTORY OF PRESENT ILLNESS:    The patient is a 56 y.o. year old male with past medical history significant for HTN, GERD, BPH and anxiety who was noted to have an abnormal M-protein on a lab work from 11/8/21. The SPEP/KATRINA showed presence of IgG lambda monoclonal protein measuring 0.5 gm/dl. Normal FLC ratio of 1.54. Immunoglobulin levels (IgA< IgM, IgG) were within normal limits.   A CBC done the same day showed Hb/Hct 13.0/39.3, normal WBC, platelets and differential. CMP showed normal serum creatinine and calcium levels.   A skeletal surgery from 11/15/21 was negative for any suspicious bone lesions.   Patient works as . Denies any major complaints. Says he works out regularly and plays sport. Denies excessive fatigue, joint or bone pain.   Denies any active smoking, alcohol or drug abuse. No family history of blood or bone marrow disorders.    Past Medical History:   Diagnosis Date   • Anxiety    • BPH with obstruction/lower urinary tract symptoms 8/7/2019   • Cervical disc herniation 2/15/2017   • Chronic low back pain 5/11/2021   • False positive cardiac stress test     2018, essentially normal coronaries   • GERD (gastroesophageal reflux disease) 5/11/2021   • Hypertension    • MARIA DOLORES on CPAP 11/19/2019     Past Surgical History:   Procedure Laterality Date   • JOINT REPLACEMENT Left     KNEE   • SPINE SURGERY         MEDICATIONS    Current Outpatient Medications:   •  diclofenac (VOLTAREN) 75 MG EC tablet, Take 1 tablet by mouth 2 (Two) Times a Day., Disp: 60 tablet, Rfl: 0  •  Diclofenac Sodium (VOLTAREN) 1 % gel gel, Apply 4 g topically to the appropriate  area as directed 4 (Four) Times a Day As Needed (knee pain, back pain)., Disp: 100 g, Rfl: 5  •  EPINEPHrine (EPIPEN) 0.3 MG/0.3ML solution auto-injector injection, , Disp: , Rfl:   •  gabapentin (Neurontin) 600 MG tablet, Take 1 tablet by mouth 3 (Three) Times a Day., Disp: 90 tablet, Rfl: 5  •  HYDROcodone-acetaminophen (NORCO)  MG per tablet, Take 2 tablets by mouth 4 (Four) Times a Day., Disp: , Rfl:   •  lidocaine (LIDODERM) 5 %, Place 1 patch on the skin as directed by provider Daily. Remove & Discard patch within 12 hours or as directed by MD, Disp: 30 patch, Rfl: 12  •  meloxicam (MOBIC) 15 MG tablet, , Disp: , Rfl:   •  methocarbamol (ROBAXIN) 750 MG tablet, Take 1 tablet by mouth 3 (Three) Times a Day As Needed for Muscle Spasms., Disp: 90 tablet, Rfl: 3  •  pantoprazole (PROTONIX) 40 MG EC tablet, Take 1 tablet by mouth Daily., Disp: 90 tablet, Rfl: 3  •  predniSONE (DELTASONE) 10 MG tablet, 6 tabs po qd x 2 days, 4 tabs po qd x 2 days, 3 tabs po qd x 2 days, 2 tabs po qd x 2 days, 1 tab po qd x 2 days, Disp: 32 tablet, Rfl: 0  •  promethazine (PHENERGAN) 12.5 MG tablet, Take 1 tablet by mouth Every 8 (Eight) Hours As Needed for Nausea. Uses sparingly, Disp: 24 tablet, Rfl: 2  •  venlafaxine XR (EFFEXOR-XR) 150 MG 24 hr capsule, Take 1 capsule by mouth Daily., Disp: 30 capsule, Rfl: 5    Current Facility-Administered Medications:   •  nitroglycerin (NITROSTAT) SL tablet 0.4 mg, 0.4 mg, Sublingual, Q5 Min PRN, Kahlil Mcmanus MD  •  sodium chloride 0.9 % flush 10 mL, 10 mL, Intravenous, PRN, Kahlil Mcmanus MD    ALLERGIES:     Allergies   Allergen Reactions   • Fish-Derived Products Anaphylaxis   • Shellfish-Derived Products Anaphylaxis   • Trazodone Unknown - Low Severity     Jittery       SOCIAL HISTORY:       Social History     Socioeconomic History   • Marital status:    Tobacco Use   • Smoking status: Former Smoker     Packs/day: 0.75     Types: Cigarettes     Start date: 1/1/1981     Quit  "date: 6/10/2005     Years since quittin.4   • Smokeless tobacco: Never Used   Substance and Sexual Activity   • Alcohol use: Never   • Drug use: Yes     Types: Marijuana         FAMILY HISTORY:  Family History   Problem Relation Age of Onset   • Heart disease Mother    • Hypertension Mother    • Dementia Mother    • Alzheimer's disease Mother    • Heart disease Father    • Hypertension Father    • Diabetes Sister    • Diabetes Brother    • Dementia Maternal Grandmother    • Alzheimer's disease Maternal Grandmother    • Dementia Sister        REVIEW OF SYSTEMS:  Constitutional: [No fevers, chills, sweats]  Eye: [No recent visual problems]  ENMT: [No ear pain, nasal congestion, sore throat]  Respiratory: [No shortness of breath, cough]  Cardiovascular: [No Chest pain, palpitations, syncope]  Gastrointestinal: [No nausea, vomiting, diarrhea]  Genitourinary: [No hematuria]  Hema/Lymph: [Negative for bruising tendency, swollen lymph glands]  Endocrine: [Negative for excessive thirst, excessive hunger]  Musculoskeletal: [Denies any musculoskeletal pain or swelling]  Integumentary: [No rash, pruritus, abrasions]  Neurologic: [ No weakness or numbness, Alert & oriented X 4]       Vitals:    21 1413   BP: 157/70   Pulse: 64   Resp: 16   Temp: 98 °F (36.7 °C)   TempSrc: Temporal   SpO2: 99%   Weight: 64.8 kg (142 lb 12.8 oz)   Height: 171 cm (67.32\")  Comment: new    PainSc: 0-No pain     Current Status 2021   ECOG score 0      PHYSICAL EXAM:    CONSTITUTIONAL:  Vital signs reviewed.  No distress, looks comfortable.  EYES:  Conjunctiva and lids unremarkable.   EARS,NOSE,MOUTH,THROAT:  Ears and nose appear unremarkable.  Lips, teeth, gums appear unremarkable.  RESPIRATORY:  Normal respiratory effort.  Lungs clear to auscultation bilaterally.  CARDIOVASCULAR:  Normal S1, S2.  No murmurs rubs or gallops.  No significant lower extremity edema.  GASTROINTESTINAL: Abdomen appears unremarkable.  " Nondistended  LYMPHATIC:  No cervical, supraclavicular lymphadenopathy.  NEURO: cranial nerves 2-12 grossly intact.  No focal deficits.  Appears to have equal strength all 4 extremities.  MUSCULOSKELETAL:  Unremarkable digits/nails.  No cyanosis or clubbing.  No apparent joint deformities.  SKIN:  Warm.  No rashes.  PSYCHIATRIC:  Normal judgment and insight.  Normal mood and affect.     RECENT LABS:        Lab on 11/24/2021   Component Date Value Ref Range Status   • WBC 11/24/2021 5.53  3.40 - 10.80 10*3/mm3 Final   • RBC 11/24/2021 3.70* 4.14 - 5.80 10*6/mm3 Final   • Hemoglobin 11/24/2021 11.8* 13.0 - 17.7 g/dL Final   • Hematocrit 11/24/2021 36.5* 37.5 - 51.0 % Final   • MCV 11/24/2021 98.6* 79.0 - 97.0 fL Final   • MCH 11/24/2021 31.9  26.6 - 33.0 pg Final   • MCHC 11/24/2021 32.3  31.5 - 35.7 g/dL Final   • RDW 11/24/2021 12.8  12.3 - 15.4 % Final   • RDW-SD 11/24/2021 46.1  37.0 - 54.0 fl Final   • MPV 11/24/2021 9.3  6.0 - 12.0 fL Final   • Platelets 11/24/2021 240  140 - 450 10*3/mm3 Final   • Neutrophil % 11/24/2021 46.1  42.7 - 76.0 % Final   • Lymphocyte % 11/24/2021 41.6  19.6 - 45.3 % Final   • Monocyte % 11/24/2021 7.6  5.0 - 12.0 % Final   • Eosinophil % 11/24/2021 3.4  0.3 - 6.2 % Final   • Basophil % 11/24/2021 0.9  0.0 - 1.5 % Final   • Immature Grans % 11/24/2021 0.4  0.0 - 0.5 % Final   • Neutrophils, Absolute 11/24/2021 2.55  1.70 - 7.00 10*3/mm3 Final   • Lymphocytes, Absolute 11/24/2021 2.30  0.70 - 3.10 10*3/mm3 Final   • Monocytes, Absolute 11/24/2021 0.42  0.10 - 0.90 10*3/mm3 Final   • Eosinophils, Absolute 11/24/2021 0.19  0.00 - 0.40 10*3/mm3 Final   • Basophils, Absolute 11/24/2021 0.05  0.00 - 0.20 10*3/mm3 Final   • Immature Grans, Absolute 11/24/2021 0.02  0.00 - 0.05 10*3/mm3 Final   • nRBC 11/24/2021 0.0  0.0 - 0.2 /100 WBC Final         ASSESSMENT:   is a pleasant 57 y/o M with past medical history significant for HTN, GERD, BPH and anxiety who comes for MGUS evaluation &  "management.     # Monoclonal gammopathy of unknown significant (MGUS):  · The SPEP/KATRINA from 11/8/21 had shown presence of an IgG lambda monoclonal protein measuring 0.5 gm/dl. Normal FLC ratio of 1.54. Immunoglobulin levels (IgA< IgM, IgG) were within normal limits.   · A CBC done the same day showed Hb/Hct 13.0/39.3, normal WBC, platelets and differential. MCV elevated at 99 fl. CMP showed normal serum creatinine and calcium levels. A skeletal surgery from 11/15/21 was negative for any suspicious bone lesions.   · Patient works as . Denies any major complaints. Says he works out regularly and plays sport. Denies excessive fatigue, joint or bone pain.   · Patient does not have any \"CRAB\" features to suggest multiple myeloma. His IgG M-protein level is very low at 0.5 gm/dl.   · Informed patient this is most consistent with MGUS. Discussed various causes of abnormal M-protein detected in serum, including infections, inflammation (as a component of polyclonal proliferation) & plasma cell dyscrasias (multiple myeloma, SMM, MGUS and waldenstrom's macroglobulinemia etc). There is a low risk ~ 1% per year of progression to multiple myeloma.   · Since this is a low risk MGUS, will plan to continue active surveillance and repeat labs in 6 months time.    #  Risk of osteoporosis, thromboembolism and secondary malignancies with MGUS:   Patient is active. Ex-smoker.   Will continue discussions and counseling.    # Anemia with macrocytosis:  · Patient has baseline Hb in 11-12 gm/dl range at least since 2018.   · Recent Vit B12, folate and TSH levels unremarkable.   · Peripheral smear review performed by me in clinic shows normocytic RBCs, no increased schistocytes, no blasts or increased immature cells, adequate platelets.   · Unlikely related to MGUS. Other cell lines within normal limits.  · Given it's stability over last 3 years, will plan to monitor for now.   · If progressive, will plan for bone marrow biopsy. "     PLAN:   - Low risk MGUS - continue active surveillance & repeat labs in 6 months  - Mild macrocytic anemia - stable. Asymptomatic. Work up negative so far. Monitor.   - F/u in 6 months with repeat labs    Orders Placed This Encounter   Procedures   • KATRINA,PE and FLC, Serum     Standing Status:   Future     Standing Expiration Date:   11/24/2022     Order Specific Question:   Release to patient     Answer:   Immediate   • Comprehensive Metabolic Panel     Standing Status:   Future     Standing Expiration Date:   11/24/2022     Order Specific Question:   Release to patient     Answer:   Immediate   • CBC & Differential     Standing Status:   Future     Standing Expiration Date:   11/24/2022     Order Specific Question:   Manual Differential     Answer:   No     Total time spent during this patient encounter is 65 minutes. The total time spent with the patient includes at least one or more of the following: preparing to see the patient by reviewing of tests, prior notes or other relevant information, performing appropriate independent examination & evaluation, counseling, ordering of medications, tests or procedures, communicating with other healthcare professionals, when appropriate to coordinate care, documenting clinic information in the electronic medical records or other health records, independently interpreting results of tests and communicating the results to the patient/family or caregiver.

## 2021-12-10 ENCOUNTER — APPOINTMENT (OUTPATIENT)
Dept: LAB | Facility: HOSPITAL | Age: 56
End: 2021-12-10

## 2022-03-24 ENCOUNTER — TELEPHONE (OUTPATIENT)
Dept: FAMILY MEDICINE CLINIC | Facility: CLINIC | Age: 57
End: 2022-03-24

## 2022-03-24 NOTE — TELEPHONE ENCOUNTER
Caller: Kaiser Grier    Relationship to patient: Self    Best call back number: [phone]    Patient is needing: PATIENT IS CALLING TO GET AN APPOINTMENT FOR ONGOING BACK PAIN AND PROSTATE PAIN.  THE FIRST AVAILABLE IS 06/02/22.  PATIENT ALREADY HAS AN APPOINTMENT ON 06/06/22.  PATIENT WILL NOT SEE ANYONE ELSE BUT DR. DE LA PAZ.  DO WE HAVE ANYTHING ELSE WE CAN OFFER THE PATIENT TO GET HIM IN SOONER.  HE STATES BOTH OF THE PROBLEMS ARE CAUSING A GREAT AMOUNT OF PAIN.    PLEASE ADVISE.

## 2022-03-30 ENCOUNTER — OFFICE VISIT (OUTPATIENT)
Dept: FAMILY MEDICINE CLINIC | Facility: CLINIC | Age: 57
End: 2022-03-30

## 2022-03-30 VITALS
BODY MASS INDEX: 22.29 KG/M2 | HEIGHT: 67 IN | SYSTOLIC BLOOD PRESSURE: 120 MMHG | DIASTOLIC BLOOD PRESSURE: 60 MMHG | HEART RATE: 61 BPM | WEIGHT: 142 LBS | OXYGEN SATURATION: 99 %

## 2022-03-30 DIAGNOSIS — N13.8 BPH WITH OBSTRUCTION/LOWER URINARY TRACT SYMPTOMS: Primary | ICD-10-CM

## 2022-03-30 DIAGNOSIS — N40.1 BPH WITH OBSTRUCTION/LOWER URINARY TRACT SYMPTOMS: Primary | ICD-10-CM

## 2022-03-30 DIAGNOSIS — N52.8 OTHER MALE ERECTILE DYSFUNCTION: ICD-10-CM

## 2022-03-30 DIAGNOSIS — N41.1 CHRONIC PROSTATITIS: ICD-10-CM

## 2022-03-30 PROCEDURE — 99214 OFFICE O/P EST MOD 30 MIN: CPT | Performed by: FAMILY MEDICINE

## 2022-03-30 RX ORDER — DOXYCYCLINE HYCLATE 100 MG/1
100 CAPSULE ORAL 2 TIMES DAILY
Qty: 56 CAPSULE | Refills: 0 | Status: SHIPPED | OUTPATIENT
Start: 2022-03-30 | End: 2022-09-12

## 2022-03-30 RX ORDER — TADALAFIL 5 MG/1
5 TABLET ORAL DAILY PRN
Qty: 90 TABLET | Refills: 3 | Status: SHIPPED | OUTPATIENT
Start: 2022-03-30 | End: 2022-12-12 | Stop reason: SDUPTHER

## 2022-03-30 NOTE — PROGRESS NOTES
Subjective   Kaiser Grier is a 56 y.o. male. Presents today for   Chief Complaint   Patient presents with   • Back Pain   • Prostate Check       Back Pain  This is a recurrent problem. The current episode started in the past 7 days. The problem occurs constantly. The problem is unchanged. The pain is present in the sacro-iliac. The quality of the pain is described as aching. The pain does not radiate. Associated symptoms include dysuria. He has tried nothing for the symptoms.   Benign Prostatic Hypertrophy  This is a recurrent problem. The current episode started more than 1 month ago. The problem has been gradually worsening since onset. Irritative symptoms include frequency and nocturia. Obstructive symptoms include a slower stream and a weak stream. Associated symptoms include dysuria. Pertinent negatives include no hematuria, nausea or vomiting. (Has seen urology 2 days ago, unable to get blood;  Has enlarged prostate has pending bladder us.  ) Past treatments include nothing.   patietn seeing urology for past 5 years, frustrated not better and getting worse;  Has tried flomax with no relief in past;  Has chronic low back pain, but lower back worse and notes when urinates    Review of Systems   Gastrointestinal: Negative for nausea and vomiting.   Genitourinary: Positive for dysuria, frequency and nocturia. Negative for hematuria.   Musculoskeletal: Positive for back pain.       Patient Active Problem List   Diagnosis   • Arthritis of left knee   • Bilateral chronic knee pain   • BPH with obstruction/lower urinary tract symptoms   • Cervical disc herniation   • Chronic low back pain   • Chronic neck pain   • Depression   • Dyslipidemia   • Epigastric pain   • Essential hypertension   • Facet arthritis of lumbar region   • Family history of colonic polyps   • GERD (gastroesophageal reflux disease)   • Medial meniscus tear   • MARIA DOLORES on CPAP   • Primary osteoarthritis of left knee   • Pseudoarthrosis of cervical  spine ([facility])   • S/P cervical spinal fusion   • Spondylolisthesis of cervicothoracic region   • Aftercare following joint replacement surgery   • Encounter for removal of sutures   • History of falling   • Major depressive disorder, single episode, unspecified   • Personal history of nicotine dependence   • Polyneuropathy, unspecified   • Presence of left artificial knee joint       Social History     Socioeconomic History   • Marital status:    Tobacco Use   • Smoking status: Former Smoker     Packs/day: 0.75     Types: Cigarettes     Start date: 1981     Quit date: 6/10/2005     Years since quittin.8   • Smokeless tobacco: Never Used   Substance and Sexual Activity   • Alcohol use: Never   • Drug use: Yes     Types: Marijuana       Allergies   Allergen Reactions   • Fish-Derived Products Anaphylaxis   • Shellfish-Derived Products Anaphylaxis   • Trazodone Unknown - Low Severity     Jittery       Current Outpatient Medications on File Prior to Visit   Medication Sig Dispense Refill   • diclofenac (VOLTAREN) 75 MG EC tablet Take 1 tablet by mouth 2 (Two) Times a Day. 60 tablet 0   • Diclofenac Sodium (VOLTAREN) 1 % gel gel Apply 4 g topically to the appropriate area as directed 4 (Four) Times a Day As Needed (knee pain, back pain). 100 g 5   • EPINEPHrine (EPIPEN) 0.3 MG/0.3ML solution auto-injector injection      • gabapentin (Neurontin) 600 MG tablet Take 1 tablet by mouth 3 (Three) Times a Day. 90 tablet 5   • HYDROcodone-acetaminophen (NORCO)  MG per tablet Take 2 tablets by mouth 4 (Four) Times a Day.     • lidocaine (LIDODERM) 5 % Place 1 patch on the skin as directed by provider Daily. Remove & Discard patch within 12 hours or as directed by MD 30 patch 12   • meloxicam (MOBIC) 15 MG tablet      • methocarbamol (ROBAXIN) 750 MG tablet Take 1 tablet by mouth 3 (Three) Times a Day As Needed for Muscle Spasms. 90 tablet 3   • pantoprazole (PROTONIX) 40 MG EC tablet Take 1 tablet by mouth  "Daily. 90 tablet 3   • promethazine (PHENERGAN) 12.5 MG tablet Take 1 tablet by mouth Every 8 (Eight) Hours As Needed for Nausea. Uses sparingly 24 tablet 2   • venlafaxine XR (EFFEXOR-XR) 150 MG 24 hr capsule Take 1 capsule by mouth Daily. 30 capsule 5   • [DISCONTINUED] predniSONE (DELTASONE) 10 MG tablet 6 tabs po qd x 2 days, 4 tabs po qd x 2 days, 3 tabs po qd x 2 days, 2 tabs po qd x 2 days, 1 tab po qd x 2 days 32 tablet 0     Current Facility-Administered Medications on File Prior to Visit   Medication Dose Route Frequency Provider Last Rate Last Admin   • nitroglycerin (NITROSTAT) SL tablet 0.4 mg  0.4 mg Sublingual Q5 Min PRN Kahlil Mcmanus MD       • sodium chloride 0.9 % flush 10 mL  10 mL Intravenous PRN Kahlil Mcmanus MD           Objective   Vitals:    03/30/22 1026   BP: 120/60   Pulse: 61   SpO2: 99%   Weight: 64.4 kg (142 lb)   Height: 170.2 cm (67\")     Body mass index is 22.24 kg/m².    Physical Exam  Vitals and nursing note reviewed.   Constitutional:       Appearance: He is well-developed.   Genitourinary:     Prostate: Enlarged and tender. No nodules present.      Rectum: No mass. Normal anal tone.   Musculoskeletal:      Cervical back: Neck supple.   Skin:     General: Skin is warm and dry.   Neurological:      Mental Status: He is alert.   Psychiatric:         Behavior: Behavior normal.         Assessment/Plan   Diagnoses and all orders for this visit:    1. BPH with obstruction/lower urinary tract symptoms (Primary)  -     PSA DIAGNOSTIC  -     Urinalysis With Microscopic - Urine, Clean Catch  -     tadalafil (Cialis) 5 MG tablet; Take 1 tablet by mouth Daily As Needed for Erectile Dysfunction.  Dispense: 90 tablet; Refill: 3  -     Ambulatory Referral to Urology    2. Other male erectile dysfunction  -     tadalafil (Cialis) 5 MG tablet; Take 1 tablet by mouth Daily As Needed for Erectile Dysfunction.  Dispense: 90 tablet; Refill: 3    3. Chronic prostatitis  -     doxycycline (VIBRAMYCIN) " 100 MG capsule; Take 1 capsule by mouth 2 (Two) Times a Day.  Dispense: 56 capsule; Refill: 0    ? Prostatitis - will give abx and prolonged;  Check psa and u/a with micro;  Would liek 2nd opinion from another urologist, he is ok with going to [facility] as only one group in [place].  Would like help with ED as well, will try low dose daily cialis and sent with coupon, d/w not as effective as alpha blocker, but cannot take with alpha blocker due to drug to drug ineteractoin.           -Follow up: 4 weeks and prn

## 2022-03-31 LAB
APPEARANCE UR: CLEAR
BACTERIA #/AREA URNS HPF: NORMAL /[HPF]
BILIRUB UR QL STRIP: NEGATIVE
CASTS URNS QL MICRO: NORMAL /LPF
COLOR UR: YELLOW
EPI CELLS #/AREA URNS HPF: NORMAL /HPF (ref 0–10)
GLUCOSE UR QL STRIP: NEGATIVE
HGB UR QL STRIP: NEGATIVE
KETONES UR QL STRIP: NEGATIVE
LEUKOCYTE ESTERASE UR QL STRIP: NEGATIVE
MICRO URNS: NORMAL
MICRO URNS: NORMAL
NITRITE UR QL STRIP: NEGATIVE
PH UR STRIP: 6.5 [PH] (ref 5–7.5)
PROT UR QL STRIP: NEGATIVE
RBC #/AREA URNS HPF: NORMAL /HPF (ref 0–2)
SP GR UR STRIP: 1.01 (ref 1–1.03)
UROBILINOGEN UR STRIP-MCNC: 0.2 MG/DL (ref 0.2–1)
WBC #/AREA URNS HPF: NORMAL /HPF (ref 0–5)

## 2022-05-24 ENCOUNTER — OFFICE VISIT (OUTPATIENT)
Dept: UROLOGY | Facility: CLINIC | Age: 57
End: 2022-05-24

## 2022-05-24 VITALS
TEMPERATURE: 97.8 F | WEIGHT: 142 LBS | SYSTOLIC BLOOD PRESSURE: 115 MMHG | DIASTOLIC BLOOD PRESSURE: 60 MMHG | BODY MASS INDEX: 22.29 KG/M2 | HEIGHT: 67 IN | HEART RATE: 56 BPM

## 2022-05-24 DIAGNOSIS — N13.8 BPH WITH OBSTRUCTION/LOWER URINARY TRACT SYMPTOMS: Primary | ICD-10-CM

## 2022-05-24 DIAGNOSIS — N40.1 BPH WITH OBSTRUCTION/LOWER URINARY TRACT SYMPTOMS: Primary | ICD-10-CM

## 2022-05-24 LAB
BACTERIA UR QL AUTO: NORMAL /HPF
BILIRUB BLD-MCNC: NEGATIVE MG/DL
CLARITY, POC: CLEAR
COLOR UR: YELLOW
EPI CELLS #/AREA URNS HPF: 0 /[HPF]
EXPIRATION DATE: ABNORMAL
GLUCOSE UR STRIP-MCNC: NEGATIVE MG/DL
KETONES UR QL: NEGATIVE
LEUKOCYTE EST, POC: NEGATIVE
Lab: ABNORMAL
NITRITE UR-MCNC: NEGATIVE MG/ML
PH UR: 7 [PH] (ref 5–8)
PROT UR STRIP-MCNC: NEGATIVE MG/DL
RBC # UR STRIP: ABNORMAL /UL
RBC # UR STRIP: NORMAL /HPF
RENAL EPITHELIAL, POC: 0
SP GR UR: 1.01 (ref 1–1.03)
UNIDENT CRYS URNS QL MICRO: NORMAL /HPF
UROBILINOGEN UR QL: NORMAL
WBC # UR STRIP: NORMAL /HPF

## 2022-05-24 PROCEDURE — 81003 URINALYSIS AUTO W/O SCOPE: CPT | Performed by: UROLOGY

## 2022-05-24 PROCEDURE — 99202 OFFICE O/P NEW SF 15 MIN: CPT | Performed by: UROLOGY

## 2022-05-24 NOTE — PROGRESS NOTES
"Chief Complaint  Benign Prostatic Hypertrophy with obstructive uropathy    Subjective  Patient is not happy with BPH symptoms        Kaiser Grier presents to [facility] UROLOGY  History of Present Illness    56-year-old white male has been having a lot of problem with urination for the last 4 to 5 years.  He does not empty his bladder and he has nocturia twice.  He has weak stream every time and overall AUA score is 23/35.  No dysuria or gross hematuria.  History of prostate cancer in his grandfather.  Patient is an ex-smoker.  Patient is on tadalafil 5 mg daily    Objective No acute distress  Vital Signs:   /60   Pulse 56   Temp 97.8 °F (36.6 °C)   Ht 170.2 cm (67\")   Wt 64.4 kg (142 lb)   BMI 22.24 kg/m²     Allergies   Allergen Reactions   • Fish-Derived Products Anaphylaxis   • Shellfish-Derived Products Anaphylaxis   • Trazodone Unknown - Low Severity     Jittery      Past medical history:  has a past medical history of Anxiety, BPH with obstruction/lower urinary tract symptoms (08/07/2019), Cervical disc herniation (02/15/2017), Chronic low back pain (05/11/2021), False positive cardiac stress test, GERD (gastroesophageal reflux disease) (05/11/2021), Hypertension, and MARIA DOLORES on CPAP (11/19/2019).   Past surgical history:  has a past surgical history that includes Joint replacement (Left) and Spine surgery.  Personal history: family history includes Alzheimer's disease in his maternal grandmother and mother; Dementia in his maternal grandmother, mother, and sister; Diabetes in his brother and sister; Heart disease in his father and mother; Hypertension in his father and mother.  Social history:  reports that he quit smoking about 16 years ago. His smoking use included cigarettes. He started smoking about 41 years ago. He smoked 0.75 packs per day. He has never used smokeless tobacco. He reports current drug use. Drug: Marijuana. He reports that he does not drink alcohol.    Review of " Systems    Please see past medical and surgical history rest of the system is negative    Physical Exam  Constitutional:       General: He is not in acute distress.     Appearance: Normal appearance. He is normal weight. He is not ill-appearing or toxic-appearing.   HENT:      Head: Normocephalic and atraumatic.      Ears:      Comments: No loss of hearing  Eyes:      Pupils: Pupils are equal, round, and reactive to light.   Cardiovascular:      Rate and Rhythm: Normal rate and regular rhythm.      Pulses: Normal pulses.      Heart sounds: Normal heart sounds. No murmur heard.  Pulmonary:      Effort: Pulmonary effort is normal.      Breath sounds: Normal breath sounds. No rhonchi or rales.   Abdominal:      General: Abdomen is flat.      Palpations: Abdomen is soft. There is no mass.      Tenderness: There is no abdominal tenderness. There is no right CVA tenderness.      Hernia: No hernia is present.   Genitourinary:     Penis: Normal.       Testes: Normal.      Comments: Prostate gland is just about 20 g and benign  Musculoskeletal:         General: No swelling. Normal range of motion.      Cervical back: Normal range of motion and neck supple. No rigidity or tenderness.      Right lower leg: No edema.      Left lower leg: No edema.   Lymphadenopathy:      Cervical: No cervical adenopathy.   Skin:     General: Skin is warm.      Coloration: Skin is not jaundiced.   Neurological:      General: No focal deficit present.      Mental Status: He is alert and oriented to person, place, and time.      Motor: No weakness.      Gait: Gait normal.   Psychiatric:         Mood and Affect: Mood normal.         Behavior: Behavior normal.         Thought Content: Thought content normal.         Judgment: Judgment normal.        Result Review :                 Assessment and Plan    Diagnoses and all orders for this visit:    1. BPH with obstruction/lower urinary tract symptoms (Primary)  -     POC Urinalysis Dipstick,  Automated      I discussed BPH with the patient and I need to do cystoscopy on him to see what kind of shape enlargement of prostate he has.  I want to see if he is a candidate for UroLift.  Brief Urine Lab Results  (Last result in the past 365 days)      Color   Clarity   Blood   Leuk Est   Nitrite   Protein   CREAT   Urine HCG        05/24/22 1322 Yellow   Clear   Trace   Negative   Negative   Negative                  Follow Up   No follow-ups on file.  Patient was given instructions and counseling regarding his condition or for health maintenance advice. Please see specific information pulled into the AVS if appropriate.     Bre Martinez MD

## 2022-09-01 ENCOUNTER — TELEPHONE (OUTPATIENT)
Dept: FAMILY MEDICINE CLINIC | Facility: CLINIC | Age: 57
End: 2022-09-01

## 2022-09-01 NOTE — TELEPHONE ENCOUNTER
Pt called today with dizziness, nauseous and left arm numbness. I advised pt to go to ER and have someone drive him or call 911. Pt said he will go to ER.

## 2022-09-12 ENCOUNTER — OFFICE VISIT (OUTPATIENT)
Dept: FAMILY MEDICINE CLINIC | Facility: CLINIC | Age: 57
End: 2022-09-12

## 2022-09-12 VITALS
SYSTOLIC BLOOD PRESSURE: 120 MMHG | HEART RATE: 75 BPM | DIASTOLIC BLOOD PRESSURE: 60 MMHG | HEIGHT: 67 IN | OXYGEN SATURATION: 97 % | BODY MASS INDEX: 22.29 KG/M2 | WEIGHT: 142 LBS

## 2022-09-12 DIAGNOSIS — Z00.00 MEDICARE ANNUAL WELLNESS VISIT, SUBSEQUENT: Primary | ICD-10-CM

## 2022-09-12 DIAGNOSIS — M54.16 LUMBAR RADICULOPATHY: ICD-10-CM

## 2022-09-12 DIAGNOSIS — G89.29 CHRONIC LEFT-SIDED LOW BACK PAIN WITH LEFT-SIDED SCIATICA: ICD-10-CM

## 2022-09-12 DIAGNOSIS — D47.2 MGUS (MONOCLONAL GAMMOPATHY OF UNKNOWN SIGNIFICANCE): ICD-10-CM

## 2022-09-12 DIAGNOSIS — M54.42 CHRONIC LEFT-SIDED LOW BACK PAIN WITH LEFT-SIDED SCIATICA: ICD-10-CM

## 2022-09-12 DIAGNOSIS — R10.13 EPIGASTRIC ABDOMINAL PAIN: ICD-10-CM

## 2022-09-12 DIAGNOSIS — M43.13 SPONDYLOLISTHESIS OF CERVICOTHORACIC REGION: ICD-10-CM

## 2022-09-12 DIAGNOSIS — R11.0 NAUSEA: ICD-10-CM

## 2022-09-12 DIAGNOSIS — F32.1 CURRENT MODERATE EPISODE OF MAJOR DEPRESSIVE DISORDER WITHOUT PRIOR EPISODE: ICD-10-CM

## 2022-09-12 DIAGNOSIS — H81.12 BENIGN PAROXYSMAL POSITIONAL VERTIGO OF LEFT EAR: ICD-10-CM

## 2022-09-12 PROBLEM — Z91.89 AT RISK FOR ANAPHYLAXIS: Status: ACTIVE | Noted: 2022-05-16

## 2022-09-12 PROBLEM — F12.10 CANNABIS ABUSE, UNCOMPLICATED: Status: ACTIVE | Noted: 2022-05-16

## 2022-09-12 PROBLEM — R11.2 NAUSEA AND VOMITING: Status: ACTIVE | Noted: 2022-05-16

## 2022-09-12 PROCEDURE — G0439 PPPS, SUBSEQ VISIT: HCPCS | Performed by: FAMILY MEDICINE

## 2022-09-12 PROCEDURE — 1159F MED LIST DOCD IN RCRD: CPT | Performed by: FAMILY MEDICINE

## 2022-09-12 PROCEDURE — 1125F AMNT PAIN NOTED PAIN PRSNT: CPT | Performed by: FAMILY MEDICINE

## 2022-09-12 PROCEDURE — 1170F FXNL STATUS ASSESSED: CPT | Performed by: FAMILY MEDICINE

## 2022-09-12 PROCEDURE — 99214 OFFICE O/P EST MOD 30 MIN: CPT | Performed by: FAMILY MEDICINE

## 2022-09-12 PROCEDURE — 96372 THER/PROPH/DIAG INJ SC/IM: CPT | Performed by: FAMILY MEDICINE

## 2022-09-12 RX ORDER — KETOROLAC TROMETHAMINE 30 MG/ML
60 INJECTION, SOLUTION INTRAMUSCULAR; INTRAVENOUS ONCE
Status: COMPLETED | OUTPATIENT
Start: 2022-09-12 | End: 2022-09-12

## 2022-09-12 RX ORDER — SUCRALFATE 1 G/1
1 TABLET ORAL 4 TIMES DAILY
Qty: 120 TABLET | Refills: 5 | Status: SHIPPED | OUTPATIENT
Start: 2022-09-12

## 2022-09-12 RX ORDER — VENLAFAXINE HYDROCHLORIDE 150 MG/1
150 CAPSULE, EXTENDED RELEASE ORAL DAILY
Qty: 30 CAPSULE | Refills: 5 | Status: SHIPPED | OUTPATIENT
Start: 2022-09-12

## 2022-09-12 RX ORDER — MELOXICAM 15 MG/1
15 TABLET ORAL DAILY PRN
Qty: 90 TABLET | Refills: 3 | Status: SHIPPED | OUTPATIENT
Start: 2022-09-12

## 2022-09-12 RX ORDER — METHOCARBAMOL 750 MG/1
750 TABLET, FILM COATED ORAL 3 TIMES DAILY PRN
Qty: 90 TABLET | Refills: 3 | Status: SHIPPED | OUTPATIENT
Start: 2022-09-12

## 2022-09-12 RX ORDER — PANTOPRAZOLE SODIUM 40 MG/1
40 TABLET, DELAYED RELEASE ORAL DAILY
Qty: 90 TABLET | Refills: 3 | Status: SHIPPED | OUTPATIENT
Start: 2022-09-12

## 2022-09-12 RX ORDER — LIDOCAINE 50 MG/G
1 PATCH TOPICAL EVERY 24 HOURS
Qty: 30 PATCH | Refills: 12 | Status: SHIPPED | OUTPATIENT
Start: 2022-09-12

## 2022-09-12 RX ORDER — MECLIZINE HYDROCHLORIDE 25 MG/1
25 TABLET ORAL 3 TIMES DAILY PRN
Qty: 90 TABLET | Refills: 2 | Status: SHIPPED | OUTPATIENT
Start: 2022-09-12 | End: 2023-03-25

## 2022-09-12 RX ORDER — GABAPENTIN 600 MG/1
600 TABLET ORAL 3 TIMES DAILY
Qty: 90 TABLET | Refills: 5 | Status: SHIPPED | OUTPATIENT
Start: 2022-09-12 | End: 2022-12-12 | Stop reason: SDUPTHER

## 2022-09-12 RX ORDER — TRIAMCINOLONE ACETONIDE 40 MG/ML
80 INJECTION, SUSPENSION INTRA-ARTICULAR; INTRAMUSCULAR ONCE
Status: COMPLETED | OUTPATIENT
Start: 2022-09-12 | End: 2022-09-12

## 2022-09-12 RX ADMIN — TRIAMCINOLONE ACETONIDE 80 MG: 40 INJECTION, SUSPENSION INTRA-ARTICULAR; INTRAMUSCULAR at 12:06

## 2022-09-12 RX ADMIN — KETOROLAC TROMETHAMINE 60 MG: 30 INJECTION, SOLUTION INTRAMUSCULAR; INTRAVENOUS at 12:06

## 2022-09-12 NOTE — PROGRESS NOTES
QUICK REFERENCE INFORMATION:  The ABCs of the Annual Wellness Visit    Subsequent Medicare Wellness Visit    HEALTH RISK ASSESSMENT    1965    Recent Hospitalizations:  No hospitalization(s) within the last year..        Current Medical Providers:  Patient Care Team:  Keaton Bach DO as PCP - General (Family Medicine)  Keaton Bach DO as Referring Physician (Family Medicine)        Smoking Status:  Social History     Tobacco Use   Smoking Status Former Smoker   • Packs/day: 0.75   • Types: Cigarettes   • Start date: 1981   • Quit date: 6/10/2005   • Years since quittin.2   Smokeless Tobacco Never Used       Alcohol Consumption:  Social History     Substance and Sexual Activity   Alcohol Use Never       Depression Screen:   PHQ-2/PHQ-9 Depression Screening 2022   Retired PHQ-9 Total Score -   Retired Total Score -   Little Interest or Pleasure in Doing Things 0-->not at all   Feeling Down, Depressed or Hopeless 0-->not at all   PHQ-9: Brief Depression Severity Measure Score 0       Health Habits and Functional and Cognitive Screening:  Functional & Cognitive Status 2022   Do you have difficulty preparing food and eating? No   Do you have difficulty bathing yourself, getting dressed or grooming yourself? No   Do you have difficulty using the toilet? No   Do you have difficulty moving around from place to place? No   Do you have trouble with steps or getting out of a bed or a chair? No   Current Diet Well Balanced Diet   Dental Exam Not up to date   Eye Exam Up to date   Exercise (times per week) 0 times per week   Current Exercises Include No Regular Exercise   Do you need help using the phone?  No   Are you deaf or do you have serious difficulty hearing?  No   Do you need help with transportation? No   Do you need help shopping? No   Do you need help preparing meals?  No   Do you need help with housework?  No   Do you need help with laundry? No   Do you need help taking your  medications? No   Do you need help managing money? No   Do you ever drive or ride in a car without wearing a seat belt? No   Have you felt unusual stress, anger or loneliness in the last month? No   Who do you live with? Spouse   If you need help, do you have trouble finding someone available to you? No   Have you been bothered in the last four weeks by sexual problems? No   Do you have difficulty concentrating, remembering or making decisions? No           Does the patient have evidence of cognitive impairment? No    Aspirin use counseling: Does not need ASA (and currently is not on it)      Recent Lab Results:  CMP:  Lab Results   Component Value Date     (H) 06/18/2020    BUN 12 11/08/2021    CREATININE 1.09 11/08/2021    EGFRIFNONA 75 11/08/2021    EGFRIFAFRI 87 11/08/2021    BCR 11 11/08/2021     11/08/2021    K 4.3 11/08/2021    CO2 25 11/08/2021    CALCIUM 9.2 11/08/2021    PROTENTOTREF 7.0 11/08/2021    ALBUMIN 4.5 11/08/2021    ALBUMIN 3.9 11/08/2021    LABGLOBREF 2.5 11/08/2021    LABGLOBREF 3.1 11/08/2021    LABIL2 1.8 11/08/2021    LABIL2 1.3 11/08/2021    BILITOT 0.4 11/08/2021    ALKPHOS 55 11/08/2021    AST 15 11/08/2021    ALT 11 11/08/2021     Lipid Panel:  Lab Results   Component Value Date    TRIG 97 01/20/2020    HDL 67 01/20/2020    VLDL 19 01/20/2020     HbA1c:       Visual Acuity:  No exam data present    Age-appropriate Screening Schedule:  Refer to the list below for future screening recommendations based on patient's age, sex and/or medical conditions. Orders for these recommended tests are listed in the plan section. The patient has been provided with a written plan.    Health Maintenance   Topic Date Due   • ZOSTER VACCINE (1 of 2) Never done   • INFLUENZA VACCINE  10/01/2022   • TDAP/TD VACCINES (2 - Td or Tdap) 11/11/2029        Subjective   History of Present Illness    Kaiser Grier is a 57 y.o. male who presents for an Subsequent Wellness Visit.    The following portions  of the patient's history were reviewed and updated as appropriate: allergies, current medications, past family history, past medical history, past social history, past surgical history and problem list.    Outpatient Medications Prior to Visit   Medication Sig Dispense Refill   • promethazine (PHENERGAN) 12.5 MG tablet Take 1 tablet by mouth Every 8 (Eight) Hours As Needed for Nausea. Uses sparingly 24 tablet 2   • tadalafil (Cialis) 5 MG tablet Take 1 tablet by mouth Daily As Needed for Erectile Dysfunction. 90 tablet 3   • lidocaine (LIDODERM) 5 % Place 1 patch on the skin as directed by provider Daily. Remove & Discard patch within 12 hours or as directed by MD 30 patch 12   • meloxicam (MOBIC) 15 MG tablet      • pantoprazole (PROTONIX) 40 MG EC tablet Take 1 tablet by mouth Daily. 90 tablet 3   • venlafaxine XR (EFFEXOR-XR) 150 MG 24 hr capsule Take 1 capsule by mouth Daily. 30 capsule 5   • EPINEPHrine (EPIPEN) 0.3 MG/0.3ML solution auto-injector injection      • diclofenac (VOLTAREN) 75 MG EC tablet Take 1 tablet by mouth 2 (Two) Times a Day. 60 tablet 0   • Diclofenac Sodium (VOLTAREN) 1 % gel gel Apply 4 g topically to the appropriate area as directed 4 (Four) Times a Day As Needed (knee pain, back pain). 100 g 5   • doxycycline (VIBRAMYCIN) 100 MG capsule Take 1 capsule by mouth 2 (Two) Times a Day. 56 capsule 0   • gabapentin (Neurontin) 600 MG tablet Take 1 tablet by mouth 3 (Three) Times a Day. 90 tablet 5   • HYDROcodone-acetaminophen (NORCO)  MG per tablet Take 2 tablets by mouth 4 (Four) Times a Day.     • methocarbamol (ROBAXIN) 750 MG tablet Take 1 tablet by mouth 3 (Three) Times a Day As Needed for Muscle Spasms. 90 tablet 3     Facility-Administered Medications Prior to Visit   Medication Dose Route Frequency Provider Last Rate Last Admin   • nitroglycerin (NITROSTAT) SL tablet 0.4 mg  0.4 mg Sublingual Q5 Min PRN Kahlil Mcmanus MD       • sodium chloride 0.9 % flush 10 mL  10 mL  Intravenous PRN Kahlil Mcmanus MD           Patient Active Problem List   Diagnosis   • Arthritis of left knee   • Bilateral chronic knee pain   • BPH with obstruction/lower urinary tract symptoms   • Cervical disc herniation   • Chronic low back pain   • Chronic neck pain   • Depression   • Dyslipidemia   • Epigastric pain   • Essential hypertension   • Facet arthritis of lumbar region   • Family history of colonic polyps   • GERD (gastroesophageal reflux disease)   • Medial meniscus tear   • MARIA DOLORES on CPAP   • Primary osteoarthritis of left knee   • Pseudoarthrosis of cervical spine (HCC)   • S/P cervical spinal fusion   • Spondylolisthesis of cervicothoracic region   • Aftercare following joint replacement surgery   • Encounter for removal of sutures   • History of falling   • Major depressive disorder, single episode, unspecified   • Personal history of nicotine dependence   • Polyneuropathy, unspecified   • Presence of left artificial knee joint   • At risk for anaphylaxis   • Body mass index (BMI) of 22.0-22.9 in adult   • Cannabis abuse, uncomplicated   • Nausea and vomiting   • Routine health maintenance       Advance Care Planning:  ACP discussion was held with the patient during this visit. Patient does not have an advance directive, information provided.    Identification of Risk Factors:  Risk factors include: Chronic Pain .    Review of Systems   Constitutional: Negative for fever and weight loss.   HENT: Negative for congestion.    Cardiovascular: Negative for chest pain.   Gastrointestinal: Positive for nausea. Negative for abdominal pain, constipation, diarrhea, hematochezia, melena and vomiting.   Musculoskeletal: Positive for back pain.   Neurological: Positive for dizziness and vertigo. Negative for headaches.       Compared to one year ago, the patient feels his physical health is the same.  Compared to one year ago, the patient feels his mental health is the same.    Objective     Physical  "Exam  Vitals and nursing note reviewed.   Constitutional:       Appearance: He is well-developed.   HENT:      Head: Normocephalic and atraumatic.      Right Ear: Tympanic membrane normal.      Left Ear: Tympanic membrane normal.      Nose: Nose normal.   Neck:      Thyroid: No thyromegaly.      Vascular: No JVD.   Cardiovascular:      Rate and Rhythm: Normal rate and regular rhythm.      Heart sounds: Normal heart sounds. No murmur heard.    No friction rub. No gallop.   Pulmonary:      Effort: Pulmonary effort is normal. No respiratory distress.      Breath sounds: Normal breath sounds. No wheezing or rales.   Abdominal:      General: Bowel sounds are normal. There is no distension.      Palpations: Abdomen is soft.      Tenderness: There is no abdominal tenderness. There is no guarding or rebound.   Musculoskeletal:      Cervical back: Neck supple.   Skin:     General: Skin is warm and dry.      Capillary Refill: Capillary refill takes less than 2 seconds.   Neurological:      General: No focal deficit present.      Mental Status: He is alert.      Cranial Nerves: No cranial nerve deficit.      Motor: No weakness or pronator drift.      Coordination: Coordination normal. Finger-Nose-Finger Test normal.      Gait: Gait normal.      Deep Tendon Reflexes: Reflexes normal.      Comments: +dixhallpike to left with horizontal nystagmus and reproduction of symptoms   Psychiatric:         Behavior: Behavior normal.         Vitals:    09/12/22 1125   BP: 120/60   Pulse: 75   SpO2: 97%   Weight: 64.4 kg (142 lb)   Height: 170.2 cm (67\")   PainSc:   4   PainLoc: Back       BMI is within normal parameters. No other follow-up for BMI required.      Assessment & Plan   Patient Self-Management and Personalized Health Advice  The patient has been provided with information about: fall prevention and preventive services including:   · Annual Wellness Visit (AWV).    Visit Diagnoses:    ICD-10-CM ICD-9-CM   1. Medicare annual " wellness visit, subsequent  Z00.00 V70.0   2. Benign paroxysmal positional vertigo of left ear  H81.12 386.11   3. Spondylolisthesis of cervicothoracic region  M43.13 738.4   4. Chronic left-sided low back pain with left-sided sciatica  M54.42 724.2    G89.29 724.3     338.29   5. Lumbar radiculopathy  M54.16 724.4   6. Epigastric abdominal pain  R10.13 789.06   7. Nausea  R11.0 787.02   8. Current moderate episode of major depressive disorder without prior episode ([facility])  F32.1 296.22   9. MGUS (monoclonal gammopathy of unknown significance)  D47.2 273.1       Orders Placed This Encounter   Procedures   • Ambulatory Referral to Gastroenterology     Referral Priority:   Routine     Referral Type:   Consultation     Referral Reason:   Specialty Services Required     Requested Specialty:   Gastroenterology     Number of Visits Requested:   1       Outpatient Encounter Medications as of 9/12/2022   Medication Sig Dispense Refill   • gabapentin (Neurontin) 600 MG tablet Take 1 tablet by mouth 3 (Three) Times a Day. 90 tablet 5   • lidocaine (LIDODERM) 5 % Place 1 patch on the skin as directed by provider Daily. Remove & Discard patch within 12 hours or as directed by MD 30 patch 12   • meloxicam (MOBIC) 15 MG tablet Take 1 tablet by mouth Daily As Needed (pain). 90 tablet 3   • methocarbamol (ROBAXIN) 750 MG tablet Take 1 tablet by mouth 3 (Three) Times a Day As Needed for Muscle Spasms. 90 tablet 3   • pantoprazole (PROTONIX) 40 MG EC tablet Take 1 tablet by mouth Daily. 90 tablet 3   • promethazine (PHENERGAN) 12.5 MG tablet Take 1 tablet by mouth Every 8 (Eight) Hours As Needed for Nausea. Uses sparingly 24 tablet 2   • tadalafil (Cialis) 5 MG tablet Take 1 tablet by mouth Daily As Needed for Erectile Dysfunction. 90 tablet 3   • venlafaxine XR (EFFEXOR-XR) 150 MG 24 hr capsule Take 1 capsule by mouth Daily. 30 capsule 5   • [DISCONTINUED] lidocaine (LIDODERM) 5 % Place 1 patch on the skin as directed by provider  Daily. Remove & Discard patch within 12 hours or as directed by MD 30 patch 12   • [DISCONTINUED] meloxicam (MOBIC) 15 MG tablet      • [DISCONTINUED] pantoprazole (PROTONIX) 40 MG EC tablet Take 1 tablet by mouth Daily. 90 tablet 3   • [DISCONTINUED] venlafaxine XR (EFFEXOR-XR) 150 MG 24 hr capsule Take 1 capsule by mouth Daily. 30 capsule 5   • EPINEPHrine (EPIPEN) 0.3 MG/0.3ML solution auto-injector injection      • meclizine (ANTIVERT) 25 MG tablet Take 1 tablet by mouth 3 (Three) Times a Day As Needed for Dizziness. 90 tablet 2   • sucralfate (Carafate) 1 g tablet Take 1 tablet by mouth 4 (Four) Times a Day. 120 tablet 5   • [DISCONTINUED] diclofenac (VOLTAREN) 75 MG EC tablet Take 1 tablet by mouth 2 (Two) Times a Day. 60 tablet 0   • [DISCONTINUED] Diclofenac Sodium (VOLTAREN) 1 % gel gel Apply 4 g topically to the appropriate area as directed 4 (Four) Times a Day As Needed (knee pain, back pain). 100 g 5   • [DISCONTINUED] doxycycline (VIBRAMYCIN) 100 MG capsule Take 1 capsule by mouth 2 (Two) Times a Day. 56 capsule 0   • [DISCONTINUED] gabapentin (Neurontin) 600 MG tablet Take 1 tablet by mouth 3 (Three) Times a Day. 90 tablet 5   • [DISCONTINUED] HYDROcodone-acetaminophen (NORCO)  MG per tablet Take 2 tablets by mouth 4 (Four) Times a Day.     • [DISCONTINUED] methocarbamol (ROBAXIN) 750 MG tablet Take 1 tablet by mouth 3 (Three) Times a Day As Needed for Muscle Spasms. 90 tablet 3     Facility-Administered Encounter Medications as of 9/12/2022   Medication Dose Route Frequency Provider Last Rate Last Admin   • [COMPLETED] ketorolac (TORADOL) injection 60 mg  60 mg Intramuscular Once Keaton Bach DO   60 mg at 09/12/22 1206   • nitroglycerin (NITROSTAT) SL tablet 0.4 mg  0.4 mg Sublingual Q5 Min PRN Kahlil Mcmanus MD       • sodium chloride 0.9 % flush 10 mL  10 mL Intravenous PRN Kahlil Mcmanus MD       • [COMPLETED] triamcinolone acetonide (KENALOG-40) injection 80 mg  80 mg Intra-articular  Once Keaton Bach DO   80 mg at 09/12/22 1206       Reviewed use of high risk medication in the elderly: yes  Reviewed for potential of harmful drug interactions in the elderly: yes    Follow Up:  Return in about 3 months (around 12/12/2022), or if symptoms worsen or fail to improve.     An After Visit Summary and PPPS with all of these plans were given to the patient.           ++++++++++++++++++++++++++++++++++++++++++++++++++++++++++++++++++     Chief Complaint   Patient presents with   • Dizziness   • Nausea   • Annual Exam     medicare   • Colonoscopy     Need orders     • Back Pain     Dizziness  This is a new problem. The current episode started more than 1 month ago. The problem has been waxing and waning. Associated symptoms include nausea and vertigo. Pertinent negatives include no abdominal pain, chest pain, congestion, fever, headaches or vomiting. Associated symptoms comments: Tinnitus bl;  Hearing ok. Exacerbated by: quick movements. Treatments tried: phenergan helps nausea.   Abdominal Pain  This is a chronic problem. The current episode started more than 1 year ago. The onset quality is sudden. The problem occurs intermittently. The problem has been waxing and waning. The pain is located in the epigastric region. The pain is moderate. Associated symptoms include nausea. Pertinent negatives include no constipation, diarrhea, fever, headaches, hematochezia, melena, vomiting or weight loss. Exacerbated by: is on nsaids orally for back. Relieved by: phenergan some. He has tried proton pump inhibitors for the symptoms. The treatment provided mild relief. Prior diagnostic workup includes GI consult (reports had EGD in past, do not see, last c-scope 2020). There is no history of abdominal surgery, pancreatitis or PUD. Had H pylori check 10 months ago with breath test and negative   Back Pain  This is a chronic problem. The current episode started more than 1 year ago. The problem occurs constantly.  "The problem has been gradually worsening since onset. The pain is present in the lumbar spine. The quality of the pain is described as aching. The pain radiates to the left knee, left thigh and left foot. The symptoms are aggravated by position. Pertinent negatives include no abdominal pain, chest pain, fever, headaches or weight loss. He has tried NSAIDs and muscle relaxant (working 14 hour days as no help at work and exacerbating pain/back.) for the symptoms. The treatment provided mild relief.       Needs all meds refilled today    Review of Systems   Constitutional: Negative for fever and weight loss.   HENT: Negative for congestion.    Cardiovascular: Negative for chest pain.   Musculoskeletal: Positive for back pain.   Gastrointestinal: Positive for nausea. Negative for abdominal pain, constipation, diarrhea, hematochezia, melena and vomiting.   Neurological: Positive for dizziness and vertigo. Negative for headaches.       Social History     Tobacco Use   • Smoking status: Former Smoker     Packs/day: 0.75     Types: Cigarettes     Start date: 1981     Quit date: 6/10/2005     Years since quittin.2   • Smokeless tobacco: Never Used   Substance Use Topics   • Alcohol use: Never     O:   Vitals:    22 1125   BP: 120/60   Pulse: 75   SpO2: 97%   Weight: 64.4 kg (142 lb)   Height: 170.2 cm (67\")   PainSc:   4   PainLoc: Back     Body mass index is 22.24 kg/m².  Vitals and nursing note reviewed.   Constitutional:       Appearance: Well-developed.   HENT:      Head: Normocephalic and atraumatic.      Right Ear: Tympanic membrane normal.      Left Ear: Tympanic membrane normal.      Nose: Nose normal.   Neck:      Thyroid: No thyromegaly.      Vascular: No JVD.   Pulmonary:      Effort: Pulmonary effort is normal. No respiratory distress.      Breath sounds: Normal breath sounds. No wheezing. No rales.   Cardiovascular:      Normal rate. Regular rhythm. Normal heart sounds.      No gallop. No friction " rub.   Abdominal:      General: Bowel sounds are normal. There is no distension.      Palpations: Abdomen is soft.      Tenderness: There is no abdominal tenderness. There is no guarding or rebound.   Musculoskeletal:      Cervical back: Neck supple. Skin:     General: Skin is warm and dry.      Capillary Refill: Capillary refill takes less than 2 seconds.   Neurological:      General: No focal deficit present.      Mental Status: Alert.      Cranial Nerves: No cranial nerve deficit.      Motor: No weakness or pronator drift.      Coordination: Coordination normal. Finger-Nose-Finger Test normal.      Gait: Gait normal.      Deep Tendon Reflexes: Reflexes normal.      Comments: +dixhallpike to left with horizontal nystagmus and reproduction of symptoms   Psychiatric:         Behavior: Behavior normal.         Encounter Date      11/13/21      XR bone survey complete [[id number]] (Order [id number])  Order  Status: Final result           Study Notes       Luh Munoz on 11/15/2021  5:20 PM   Monoclonal gammopathy of unknown significance     Hx HTN                Appointment Information      PACS Images     Radiology Images    Study Result    Narrative & Impression   XR BONE SURVEY COMPLETE-  11/15/2021     HISTORY: Monoclonal gammopathy of unknown significance. Evaluate for  neoplasm.     13 images are submitted. There is an anterior cervical plate fusing C4  through C7 with intervertebral disc spacers at C4-5, C5-6 and C6-7.  There is posterior fusion hardware extending from C2 through T2.     Lateral view of the skull shows no lytic or sclerotic lesions.     There is mild thoracic scoliosis. Lungs appear clear. Remainder of the  skeleton demonstrates no definite lytic or sclerotic lesions. A left  knee arthroplasty is seen in good position.     IMPRESSION:  .   1. No definite evidence of neoplasm on this complete bone survey.  2. Postoperative changes of the cervicothoracic spine. There is mild  thoracic scoliosis.           Component      Latest Ref Rng & Units 9/16/2021 11/8/2021 11/8/2021 11/24/2021            2:28 PM  2:28 PM    WBC      3.40 - 10.80 10*3/mm3  6.0  5.53   RBC      4.14 - 5.80 10*6/mm3  3.96 (L)  3.70 (L)   Hemoglobin      13.0 - 17.7 g/dL  13.0  11.8 (L)   Hematocrit      37.5 - 51.0 %  39.3  36.5 (L)   MCV      79.0 - 97.0 fL  99 (H)  98.6 (H)   MCH      26.6 - 33.0 pg  32.8  31.9   MCHC      31.5 - 35.7 g/dL  33.1  32.3   RDW      12.3 - 15.4 %  12.9  12.8   RDW-SD      37.0 - 54.0 fl    46.1   MPV      6.0 - 12.0 fL    9.3   Platelets      140 - 450 10*3/mm3  282  240   Neutrophil Rel %      42.7 - 76.0 %  41  46.1   Lymphocyte Rel %      19.6 - 45.3 %  46  41.6   Monocyte Rel %      5.0 - 12.0 %  8  7.6   Eosinophil Rel %      0.3 - 6.2 %  3  3.4   Basophil Rel %      0.0 - 1.5 %  2  0.9   Immature Granulocyte Rel %      0.0 - 0.5 %  0  0.4   Neutrophils Absolute      1.70 - 7.00 10*3/mm3  2.4  2.55   Lymphocytes Absolute      0.70 - 3.10 10*3/mm3  2.8  2.30   Monocytes Absolute      0.10 - 0.90 10*3/mm3  0.5  0.42   Eosinophils Absolute      0.00 - 0.40 10*3/mm3  0.2  0.19   Basophils Absolute      0.00 - 0.20 10*3/mm3  0.1  0.05   Immature Grans, Absolute      0.00 - 0.05 10*3/mm3  0.0  0.02   nRBC      0.0 - 0.2 /100 WBC    0.0   Glucose      65 - 99 mg/dL  63 (L)     BUN      6 - 24 mg/dL  12     Creatinine      0.76 - 1.27 mg/dL  1.09     eGFR Non African Am      >59 mL/min/1.73  75     eGFR African Am      >59 mL/min/1.73  87     BUN/Creatinine Ratio      9 - 20  11     Sodium      134 - 144 mmol/L  140     Potassium      3.5 - 5.2 mmol/L  4.3     Chloride      96 - 106 mmol/L  103     CO2      20 - 29 mmol/L  25     Calcium      8.7 - 10.2 mg/dL  9.2     Albumin      3.8 - 4.9 g/dL  4.5 3.9    Globulin      1.5 - 4.5 g/dL  2.5 3.1    A/G Ratio      1.2 - 2.2  1.8 1.3    Total Bilirubin      0.0 - 1.2 mg/dL  0.4     Alkaline Phosphatase      44 - 121 IU/L  55     AST (SGOT)      0 - 40 IU/L  15      ALT (SGPT)      0 - 44 IU/L  11     IgG      603 - 1,613 mg/dL   1,513    IgA      90 - 386 mg/dL   155    IgM      20 - 172 mg/dL   39    Total Protein      6.0 - 8.5 g/dL   7.0    Alpha-1-Globulin      0.0 - 0.4 g/dL   0.2    Alpha-2-Globulin      0.4 - 1.0 g/dL   0.6    Beta Globulin      0.7 - 1.3 g/dL   0.9    Gamma Globulin      0.4 - 1.8 g/dL   1.4    M-Andre      Not Observed g/dL   0.5 (H)    Immunofixation Reflex, Serum         Comment (A)    Please note         Comment    Kappa FLC      3.3 - 19.4 mg/L   28.2 (H)    Free Lambda Light Chains      5.7 - 26.3 mg/L   18.3    Kappa/Lambda Ratio      0.26 - 1.65   1.54    TIBC      250 - 450 ug/dL  240 (L)     UIBC      111 - 343 ug/dL  153     Iron      38 - 169 ug/dL  87     Iron Saturation      15 - 55 %  36     D-Dimer, Quant      0.00 - 0.49 MCGFEU/mL 0.45      proBNP      0.0 - 900.0 pg/mL 77.8      Lipase      13 - 78 U/L  19     Amylase      31 - 110 U/L  90     Ferritin      30 - 400 ng/mL  46     Folate      >3.0 ng/mL  8.2     Vitamin B-12      232 - 1,245 pg/mL  435     TSH Baseline      0.450 - 4.500 uIU/mL  1.710         Study date: 21       Patient Information    Patient Name   Kaiser Grier MRN   [id number] Legal Sex   Male  (Age)   1965 (57 y.o.)         Interpretation Summary    · Findings consistent with a normal ECG stress test.  · Left ventricular ejection fraction is normal. (Calculated EF = 51%).  · Myocardial perfusion imaging indicates a normal myocardial perfusion study with no evidence of ischemia.  · Impressions are consistent with a low risk study.  · There is no prior study available for comparison.      Diagnoses and all orders for this visit:    1. Medicare annual wellness visit, subsequent (Primary)    2. Benign paroxysmal positional vertigo of left ear  -     meclizine (ANTIVERT) 25 MG tablet; Take 1 tablet by mouth 3 (Three) Times a Day As Needed for Dizziness.  Dispense: 90 tablet; Refill: 2    3.  Spondylolisthesis of cervicothoracic region  -     gabapentin (Neurontin) 600 MG tablet; Take 1 tablet by mouth 3 (Three) Times a Day.  Dispense: 90 tablet; Refill: 5    4. Chronic left-sided low back pain with left-sided sciatica  -     lidocaine (LIDODERM) 5 %; Place 1 patch on the skin as directed by provider Daily. Remove & Discard patch within 12 hours or as directed by MD  Dispense: 30 patch; Refill: 12  -     methocarbamol (ROBAXIN) 750 MG tablet; Take 1 tablet by mouth 3 (Three) Times a Day As Needed for Muscle Spasms.  Dispense: 90 tablet; Refill: 3    5. Lumbar radiculopathy  -     lidocaine (LIDODERM) 5 %; Place 1 patch on the skin as directed by provider Daily. Remove & Discard patch within 12 hours or as directed by MD  Dispense: 30 patch; Refill: 12  -     triamcinolone acetonide (KENALOG-40) injection 80 mg  -     ketorolac (TORADOL) injection 60 mg    6. Epigastric abdominal pain  -     pantoprazole (PROTONIX) 40 MG EC tablet; Take 1 tablet by mouth Daily.  Dispense: 90 tablet; Refill: 3  -     Ambulatory Referral to Gastroenterology  -     sucralfate (Carafate) 1 g tablet; Take 1 tablet by mouth 4 (Four) Times a Day.  Dispense: 120 tablet; Refill: 5    7. Nausea  -     pantoprazole (PROTONIX) 40 MG EC tablet; Take 1 tablet by mouth Daily.  Dispense: 90 tablet; Refill: 3    8. Current moderate episode of major depressive disorder without prior episode (HCC)  -     venlafaxine XR (EFFEXOR-XR) 150 MG 24 hr capsule; Take 1 capsule by mouth Daily.  Dispense: 30 capsule; Refill: 5    9. MGUS (monoclonal gammopathy of unknown significance)    Other orders  -     meloxicam (MOBIC) 15 MG tablet; Take 1 tablet by mouth Daily As Needed (pain).  Dispense: 90 tablet; Refill: 3    -abd pain epigastric ongoing for several months;  Has labs negative for same pain;  Will continue ppi and add sucralfate;  Send to GI for possible endoscopy;   Is on meloxicam and warned can cause ulcerations.   Refill meds for back  pain and try injections;     bpv - if any focal neuro deficits go to ED immediately, counseled on diff dx and that history and exam classic for BPV, but if any other symptoms then will need furthe rwork-up;  Ok meclizine prn and gave tobar-darhoff exercises to perform;  Consider PT and/or ENT if fails to resolved.  Refill meds  Had MGUS last year, will need repeat labs at next ov. Including spep, cbc and if back worsens, consider MRI to evaluate;  Had negative bone survey last fall.      Reviewed care everywhere and had c-scope 2/7/22 and per op note normal.  Return in about 3 months (around 12/12/2022), or if symptoms worsen or fail to improve.

## 2022-09-12 NOTE — PATIENT INSTRUCTIONS
Advance Care Planning and Advance Directives     You make decisions on a daily basis - decisions about where you want to live, your career, your home, your life. Perhaps one of the most important decisions you face is your choice for future medical care. Take time to talk with your family and your healthcare team and start planning today.  Advance Care Planning is a process that can help you:  Understand possible future healthcare decisions in light of your own experiences  Reflect on those decision in light of your goals and values  Discuss your decisions with those closest to you and the healthcare professionals that care for you  Make a plan by creating a document that reflects your wishes    Surrogate Decision Maker  In the event of a medical emergency, which has left you unable to communicate or to make your own decisions, you would need someone to make decisions for you.  It is important to discuss your preferences for medical treatment with this person while you are in good health.     Qualities of a surrogate decision maker:  Willing to take on this role and responsibility  Knows what you want for future medical care  Willing to follow your wishes even if they don't agree with them  Able to make difficult medical decisions under stressful circumstances    Advance Directives  These are legal documents you can create that will guide your healthcare team and decision maker(s) when needed. These documents can be stored in the electronic medical record.    Living Will - a legal document to guide your care if you have a terminal condition or a serious illness and are unable to communicate. States vary by statute in document names/types, but most forms may include one or more of the following:        -  Directions regarding life-prolonging treatments        -  Directions regarding artificially provided nutrition/hydration        -  Choosing a healthcare decision maker        -  Direction regarding organ/tissue  donation    Durable Power of  for Healthcare - this document names an -in-fact to make medical decisions for you, but it may also allow this person to make personal and financial decisions for you. Please seek the advice of an  if you need this type of document.    **Advance Directives are not required and no one may discriminate against you if you do not sign one.    Medical Orders  Many states allow specific forms/orders signed by your physician to record your wishes for medical treatment in your current state of health. This form, signed in personal communication with your physician, addresses resuscitation and other medical interventions that you may or may not want.      For more information or to schedule a time with a [facility] Advance Care Planning Facilitator contact: [facility] or call [phone] and someone will contact you directly.Below are four things you can do to prevent falls:   Begin an exercise program to improve your leg strength & balance  Ask your doctor or pharmacist to review your medicines   Get annual eye check-ups & update your eyeglasses  Make your home safer by:  Removing clutter & tripping hazards  Putting railings on all stairs & adding grab bars in the bathroom  Having good lighting, especially on stairs    Contact your local community or senior center for information on exercise, fall prevention programs, or options for improving home safety.

## 2022-09-14 ENCOUNTER — DOCUMENTATION (OUTPATIENT)
Dept: FAMILY MEDICINE CLINIC | Facility: CLINIC | Age: 57
End: 2022-09-14

## 2022-10-10 ENCOUNTER — TELEPHONE (OUTPATIENT)
Dept: FAMILY MEDICINE CLINIC | Facility: CLINIC | Age: 57
End: 2022-10-10

## 2022-10-10 DIAGNOSIS — R11.0 NAUSEA: ICD-10-CM

## 2022-10-10 RX ORDER — PROMETHAZINE HYDROCHLORIDE 12.5 MG/1
12.5 TABLET ORAL EVERY 8 HOURS PRN
Qty: 24 TABLET | Refills: 2 | Status: SHIPPED | OUTPATIENT
Start: 2022-10-10 | End: 2023-03-13 | Stop reason: SDUPTHER

## 2022-10-10 NOTE — TELEPHONE ENCOUNTER
Caller: Kaiser Grier    Relationship: Self    Best call back number: [phone]    What medications are you currently taking:   Current Outpatient Medications on File Prior to Visit   Medication Sig Dispense Refill   • EPINEPHrine (EPIPEN) 0.3 MG/0.3ML solution auto-injector injection      • gabapentin (Neurontin) 600 MG tablet Take 1 tablet by mouth 3 (Three) Times a Day. 90 tablet 5   • lidocaine (LIDODERM) 5 % Place 1 patch on the skin as directed by provider Daily. Remove & Discard patch within 12 hours or as directed by MD 30 patch 12   • meclizine (ANTIVERT) 25 MG tablet Take 1 tablet by mouth 3 (Three) Times a Day As Needed for Dizziness. 90 tablet 2   • meloxicam (MOBIC) 15 MG tablet Take 1 tablet by mouth Daily As Needed (pain). 90 tablet 3   • methocarbamol (ROBAXIN) 750 MG tablet Take 1 tablet by mouth 3 (Three) Times a Day As Needed for Muscle Spasms. 90 tablet 3   • pantoprazole (PROTONIX) 40 MG EC tablet Take 1 tablet by mouth Daily. 90 tablet 3   • promethazine (PHENERGAN) 12.5 MG tablet Take 1 tablet by mouth Every 8 (Eight) Hours As Needed for Nausea. Uses sparingly 24 tablet 2   • sucralfate (Carafate) 1 g tablet Take 1 tablet by mouth 4 (Four) Times a Day. 120 tablet 5   • tadalafil (Cialis) 5 MG tablet Take 1 tablet by mouth Daily As Needed for Erectile Dysfunction. 90 tablet 3   • venlafaxine XR (EFFEXOR-XR) 150 MG 24 hr capsule Take 1 capsule by mouth Daily. 30 capsule 5     Current Facility-Administered Medications on File Prior to Visit   Medication Dose Route Frequency Provider Last Rate Last Admin   • nitroglycerin (NITROSTAT) SL tablet 0.4 mg  0.4 mg Sublingual Q5 Min PRN Kahlil Mcmanus MD       • sodium chloride 0.9 % flush 10 mL  10 mL Intravenous PRN Kahlil Mcmanus MD            Which medication are you concerned about:pantoprazole (PROTONIX) 40 MG EC tablet [[id number]] (Order [id number])    What are your concerns: MEDICATION IS NOT WORKING.  STOMACH STILL HURTS.  PATIENT REQUESTED  PROMETHAZINE.

## 2022-10-10 NOTE — TELEPHONE ENCOUNTER
Pt said they never gave him the address or when/where to go. Pt is aware and will call GI back to reschedule appt.

## 2022-12-12 ENCOUNTER — OFFICE VISIT (OUTPATIENT)
Dept: FAMILY MEDICINE CLINIC | Facility: CLINIC | Age: 57
End: 2022-12-12

## 2022-12-12 VITALS
HEIGHT: 67 IN | WEIGHT: 144 LBS | HEART RATE: 78 BPM | DIASTOLIC BLOOD PRESSURE: 58 MMHG | SYSTOLIC BLOOD PRESSURE: 118 MMHG | BODY MASS INDEX: 22.6 KG/M2 | OXYGEN SATURATION: 98 %

## 2022-12-12 DIAGNOSIS — N40.1 BPH WITH OBSTRUCTION/LOWER URINARY TRACT SYMPTOMS: ICD-10-CM

## 2022-12-12 DIAGNOSIS — T78.2XXD ANAPHYLAXIS, SUBSEQUENT ENCOUNTER: ICD-10-CM

## 2022-12-12 DIAGNOSIS — N13.8 BPH WITH OBSTRUCTION/LOWER URINARY TRACT SYMPTOMS: ICD-10-CM

## 2022-12-12 DIAGNOSIS — M43.13 SPONDYLOLISTHESIS OF CERVICOTHORACIC REGION: Primary | ICD-10-CM

## 2022-12-12 DIAGNOSIS — N52.8 OTHER MALE ERECTILE DYSFUNCTION: ICD-10-CM

## 2022-12-12 PROBLEM — M79.10 MUSCLE PAIN: Status: ACTIVE | Noted: 2022-12-12

## 2022-12-12 PROCEDURE — 99214 OFFICE O/P EST MOD 30 MIN: CPT | Performed by: FAMILY MEDICINE

## 2022-12-12 RX ORDER — TADALAFIL 5 MG/1
5 TABLET ORAL DAILY PRN
Qty: 90 TABLET | Refills: 3 | Status: SHIPPED | OUTPATIENT
Start: 2022-12-12 | End: 2023-03-13 | Stop reason: SDUPTHER

## 2022-12-12 RX ORDER — GABAPENTIN 600 MG/1
600 TABLET ORAL 3 TIMES DAILY
Qty: 270 TABLET | Refills: 1 | Status: SHIPPED | OUTPATIENT
Start: 2022-12-12 | End: 2023-03-13 | Stop reason: SDUPTHER

## 2022-12-12 RX ORDER — EPINEPHRINE 0.3 MG/.3ML
0.3 INJECTION SUBCUTANEOUS ONCE
Qty: 1 EACH | Refills: 5 | Status: SHIPPED | OUTPATIENT
Start: 2022-12-12 | End: 2022-12-12

## 2022-12-12 NOTE — PROGRESS NOTES
Subjective   Kaiser Grier is a 57 y.o. male. Presents today for   Chief Complaint   Patient presents with   • Follow-up     3 motn   • Depression   • Med Refill     EPI PEN       History of Present Illness  Patient 58 y/o male with chronic back pain, still working;  Worse with standing on concrete all day.  Gabapentin a little help;  Mood stable on meds;  Would like medical MJ;  H xof anaphylaxis due for epi pen refill.  Has bph and ed, relieved with daily cialis  Review of Systems   Musculoskeletal: Positive for back pain.       Patient Active Problem List   Diagnosis   • Arthritis of left knee   • Bilateral chronic knee pain   • BPH with obstruction/lower urinary tract symptoms   • Cervical disc herniation   • Chronic low back pain   • Chronic neck pain   • Depression   • Dyslipidemia   • Epigastric pain   • Essential hypertension   • Facet arthritis of lumbar region   • Family history of colonic polyps   • GERD (gastroesophageal reflux disease)   • Medial meniscus tear   • MARIA DOLORES on CPAP   • Primary osteoarthritis of left knee   • Pseudoarthrosis of cervical spine (HCC)   • S/P cervical spinal fusion   • Spondylolisthesis of cervicothoracic region   • Aftercare following joint replacement surgery   • Encounter for removal of sutures   • History of falling   • Major depressive disorder, single episode, unspecified   • Personal history of nicotine dependence   • Polyneuropathy, unspecified   • Presence of left artificial knee joint   • At risk for anaphylaxis   • Body mass index (BMI) of 22.0-22.9 in adult   • Cannabis abuse, uncomplicated   • Nausea and vomiting   • Routine health maintenance   • Muscle pain       Social History     Socioeconomic History   • Marital status:    Tobacco Use   • Smoking status: Former     Packs/day: 0.75     Types: Cigarettes     Start date: 1981     Quit date: 6/10/2005     Years since quittin.5   • Smokeless tobacco: Never   Vaping Use   • Vaping Use: Never used  "  Substance and Sexual Activity   • Alcohol use: Never   • Drug use: Yes     Types: Marijuana   • Sexual activity: Defer       Allergies   Allergen Reactions   • Fish-Derived Products Anaphylaxis   • Shellfish-Derived Products Anaphylaxis   • Trazodone Unknown - Low Severity     Jittery       Current Outpatient Medications on File Prior to Visit   Medication Sig Dispense Refill   • lidocaine (LIDODERM) 5 % Place 1 patch on the skin as directed by provider Daily. Remove & Discard patch within 12 hours or as directed by MD 30 patch 12   • meclizine (ANTIVERT) 25 MG tablet Take 1 tablet by mouth 3 (Three) Times a Day As Needed for Dizziness. 90 tablet 2   • meloxicam (MOBIC) 15 MG tablet Take 1 tablet by mouth Daily As Needed (pain). 90 tablet 3   • methocarbamol (ROBAXIN) 750 MG tablet Take 1 tablet by mouth 3 (Three) Times a Day As Needed for Muscle Spasms. 90 tablet 3   • pantoprazole (PROTONIX) 40 MG EC tablet Take 1 tablet by mouth Daily. 90 tablet 3   • promethazine (PHENERGAN) 12.5 MG tablet Take 1 tablet by mouth Every 8 (Eight) Hours As Needed for Nausea. Uses sparingly 24 tablet 2   • sucralfate (Carafate) 1 g tablet Take 1 tablet by mouth 4 (Four) Times a Day. 120 tablet 5   • venlafaxine XR (EFFEXOR-XR) 150 MG 24 hr capsule Take 1 capsule by mouth Daily. 30 capsule 5     Current Facility-Administered Medications on File Prior to Visit   Medication Dose Route Frequency Provider Last Rate Last Admin   • nitroglycerin (NITROSTAT) SL tablet 0.4 mg  0.4 mg Sublingual Q5 Min PRN Kahlil Mcmanus MD       • sodium chloride 0.9 % flush 10 mL  10 mL Intravenous PRN Kahlil Mcmanus MD           Objective   Vitals:    12/12/22 1451   BP: 118/58   Pulse: 78   SpO2: 98%   Weight: 65.3 kg (144 lb)   Height: 170.2 cm (67\")     Body mass index is 22.55 kg/m².    Physical Exam  Vitals and nursing note reviewed.   Constitutional:       Appearance: He is well-developed.   Musculoskeletal:      Cervical back: Neck supple. "   Skin:     General: Skin is warm and dry.   Neurological:      Mental Status: He is alert.   Psychiatric:         Behavior: Behavior normal.         Assessment & Plan   Diagnoses and all orders for this visit:    1. Spondylolisthesis of cervicothoracic region (Primary)  -     gabapentin (Neurontin) 600 MG tablet; Take 1 tablet by mouth 3 (Three) Times a Day.  Dispense: 270 tablet; Refill: 1    2. Anaphylaxis, subsequent encounter  -     EPINEPHrine (EPIPEN) 0.3 MG/0.3ML solution auto-injector injection; Inject 0.3 mL into the appropriate muscle as directed by prescriber 1 (One) Time for 1 dose.  Dispense: 1 each; Refill: 5    3. BPH with obstruction/lower urinary tract symptoms  -     tadalafil (Cialis) 5 MG tablet; Take 1 tablet by mouth Daily As Needed for Erectile Dysfunction.  Dispense: 90 tablet; Refill: 3    4. Other male erectile dysfunction  -     tadalafil (Cialis) 5 MG tablet; Take 1 tablet by mouth Daily As Needed for Erectile Dysfunction.  Dispense: 90 tablet; Refill: 3    refilled gabapentin  Discussed marijuana is not legal state of [place].  The Governors executive order just means if obtains from legitimate out of state medical mj location can be pardoned.  Gave letter list of reasons why wants and certified locatiosn in [facility], closest out of state that is legal.   Does not either stop drug screens by employer nor suggest recommended by medical provider;  Declines pain mgmt referral  Refilled cialis and epi pen         -Follow up: 6 months and prn

## 2023-01-27 ENCOUNTER — TELEPHONE (OUTPATIENT)
Dept: FAMILY MEDICINE CLINIC | Facility: CLINIC | Age: 58
End: 2023-01-27
Payer: MEDICARE

## 2023-01-27 NOTE — TELEPHONE ENCOUNTER
PT CALLED STATING HE WAS SICK AND HURTING ALL OVER. NO AVAILABLE APPTS UNTIL NEXT WEEK WITH DR DE LA PAZ. PT REFUSED URGENT CARE AND ER.   STATES HE WILL GO SOMEWHERE ELSE.  PT WAS UPSET APOLOGIZED FOR THE INCONVENIENCE.    OFFERED APPT NEXT Thursday. PT REFUSED.

## 2023-03-13 DIAGNOSIS — N13.8 BPH WITH OBSTRUCTION/LOWER URINARY TRACT SYMPTOMS: ICD-10-CM

## 2023-03-13 DIAGNOSIS — M43.13 SPONDYLOLISTHESIS OF CERVICOTHORACIC REGION: ICD-10-CM

## 2023-03-13 DIAGNOSIS — N52.8 OTHER MALE ERECTILE DYSFUNCTION: ICD-10-CM

## 2023-03-13 DIAGNOSIS — R11.0 NAUSEA: ICD-10-CM

## 2023-03-13 DIAGNOSIS — N40.1 BPH WITH OBSTRUCTION/LOWER URINARY TRACT SYMPTOMS: ICD-10-CM

## 2023-03-13 RX ORDER — GABAPENTIN 600 MG/1
600 TABLET ORAL 3 TIMES DAILY
Qty: 270 TABLET | Refills: 1 | Status: SHIPPED | OUTPATIENT
Start: 2023-03-13

## 2023-03-13 RX ORDER — PROMETHAZINE HYDROCHLORIDE 12.5 MG/1
12.5 TABLET ORAL EVERY 8 HOURS PRN
Qty: 24 TABLET | Refills: 2 | Status: SHIPPED | OUTPATIENT
Start: 2023-03-13

## 2023-03-13 RX ORDER — TADALAFIL 5 MG/1
5 TABLET ORAL DAILY PRN
Qty: 90 TABLET | Refills: 3 | Status: SHIPPED | OUTPATIENT
Start: 2023-03-13

## 2023-03-13 NOTE — TELEPHONE ENCOUNTER
Caller: Kaiser Grier    Relationship: Self    Best call back number: [phone]    Requested Prescriptions:   Requested Prescriptions     Pending Prescriptions Disp Refills   • gabapentin (Neurontin) 600 MG tablet 270 tablet 1     Sig: Take 1 tablet by mouth 3 (Three) Times a Day.   • tadalafil (Cialis) 5 MG tablet 90 tablet 3     Sig: Take 1 tablet by mouth Daily As Needed for Erectile Dysfunction.   • promethazine (PHENERGAN) 12.5 MG tablet 24 tablet 2     Sig: Take 1 tablet by mouth Every 8 (Eight) Hours As Needed for Nausea. Uses sparingly        Pharm[facility] where [facility] sent[id number] - [place], [place] - [address] [address] [facility] [address] [place] [address]  - [phone]  - [phone] FX     Additional details provided by patient: PATIENT NEEDS AUTH FROM DR ON GABAPENTIN.  ALSO HAS LESS THAN A THREE DAY SUPPPLY    Does the patient have less than a 3 day supply:  [x] Yes  [] No    Would you like a call back once the refill request has been completed: [] Yes [x] No    If the office needs to give you a call back, can they leave a voicemail: [] Yes [x] No    Trell Pinon Rep   03/13/23 16:00 EDT

## 2023-03-25 DIAGNOSIS — H81.12 BENIGN PAROXYSMAL POSITIONAL VERTIGO OF LEFT EAR: ICD-10-CM

## 2023-03-25 RX ORDER — MECLIZINE HYDROCHLORIDE 25 MG/1
TABLET ORAL
Qty: 90 TABLET | Refills: 2 | Status: SHIPPED | OUTPATIENT
Start: 2023-03-25

## 2023-05-25 ENCOUNTER — OFFICE VISIT (OUTPATIENT)
Dept: FAMILY MEDICINE CLINIC | Facility: CLINIC | Age: 58
End: 2023-05-25

## 2023-05-25 VITALS
TEMPERATURE: 96.8 F | RESPIRATION RATE: 18 BRPM | HEART RATE: 52 BPM | WEIGHT: 143 LBS | SYSTOLIC BLOOD PRESSURE: 112 MMHG | OXYGEN SATURATION: 98 % | BODY MASS INDEX: 22.44 KG/M2 | HEIGHT: 67 IN | DIASTOLIC BLOOD PRESSURE: 60 MMHG

## 2023-05-25 DIAGNOSIS — R53.83 OTHER FATIGUE: ICD-10-CM

## 2023-05-25 DIAGNOSIS — G89.29 CHRONIC BILATERAL LOW BACK PAIN WITH BILATERAL SCIATICA: Primary | ICD-10-CM

## 2023-05-25 DIAGNOSIS — Z98.1 S/P CERVICAL SPINAL FUSION: ICD-10-CM

## 2023-05-25 DIAGNOSIS — N41.1 CHRONIC PROSTATITIS: ICD-10-CM

## 2023-05-25 DIAGNOSIS — M54.16 LUMBAR RADICULOPATHY: ICD-10-CM

## 2023-05-25 DIAGNOSIS — E78.5 DYSLIPIDEMIA: ICD-10-CM

## 2023-05-25 DIAGNOSIS — R35.0 BENIGN PROSTATIC HYPERPLASIA WITH URINARY FREQUENCY: ICD-10-CM

## 2023-05-25 DIAGNOSIS — M43.13 SPONDYLOLISTHESIS OF CERVICOTHORACIC REGION: ICD-10-CM

## 2023-05-25 DIAGNOSIS — M54.42 CHRONIC BILATERAL LOW BACK PAIN WITH BILATERAL SCIATICA: Primary | ICD-10-CM

## 2023-05-25 DIAGNOSIS — N40.1 BENIGN PROSTATIC HYPERPLASIA WITH URINARY FREQUENCY: ICD-10-CM

## 2023-05-25 DIAGNOSIS — M54.12 CERVICAL RADICULOPATHY: ICD-10-CM

## 2023-05-25 DIAGNOSIS — M54.41 CHRONIC BILATERAL LOW BACK PAIN WITH BILATERAL SCIATICA: Primary | ICD-10-CM

## 2023-05-25 PROCEDURE — 99214 OFFICE O/P EST MOD 30 MIN: CPT | Performed by: FAMILY MEDICINE

## 2023-05-25 PROCEDURE — 3074F SYST BP LT 130 MM HG: CPT | Performed by: FAMILY MEDICINE

## 2023-05-25 PROCEDURE — 3078F DIAST BP <80 MM HG: CPT | Performed by: FAMILY MEDICINE

## 2023-05-25 RX ORDER — DOXYCYCLINE HYCLATE 100 MG/1
100 CAPSULE ORAL 2 TIMES DAILY
Qty: 84 CAPSULE | Refills: 0 | Status: SHIPPED | OUTPATIENT
Start: 2023-05-25

## 2023-05-25 RX ORDER — TAMSULOSIN HYDROCHLORIDE 0.4 MG/1
1 CAPSULE ORAL DAILY
Qty: 30 CAPSULE | Refills: 5 | Status: SHIPPED | OUTPATIENT
Start: 2023-05-25

## 2023-05-25 NOTE — PROGRESS NOTES
Subjective   Kaiser Grier is a 57 y.o. male. Presents today for   Chief Complaint   Patient presents with    Benign Prostatic Hypertrophy     Urinary freq     Back Pain       Neck Pain   This is a chronic problem. The current episode started more than 1 month ago. The problem has been gradually worsening. The pain is present in the left side and right side. The quality of the pain is described as aching, cramping and shooting. The pain is severe. The symptoms are aggravated by position and twisting. Associated symptoms include numbness and tingling. Associated symptoms comments: Had prior neck surgery. He has tried NSAIDs, acetaminophen, muscle relaxants and home exercises for the symptoms. The treatment provided no relief.   Back Pain  This is a chronic problem. The current episode started more than 1 year ago. The problem occurs intermittently. The problem has been rapidly worsening since onset. The pain is present in the lumbar spine. The quality of the pain is described as aching, cramping and shooting. Radiates to: legs. The symptoms are aggravated by bending, standing and twisting. Associated symptoms include dysuria, numbness and tingling. Pertinent negatives include no bladder incontinence or bowel incontinence. He has tried analgesics, NSAIDs and muscle relaxant for the symptoms. The treatment provided no relief.   Benign Prostatic Hypertrophy  This is a chronic problem. The current episode started more than 1 month ago. The problem has been gradually worsening since onset. Irritative symptoms include frequency. Obstructive symptoms include a weak stream. Associated symptoms include dysuria. Pertinent negatives include no hematuria, nausea or vomiting. Treatments tried: bph meds.   Hyperlipidemia  This is a chronic problem. The problem is uncontrolled. Current antihyperlipidemic treatment includes diet change.     Review of Systems   Constitutional:  Positive for fatigue.   Gastrointestinal:  Negative for  bowel incontinence, nausea and vomiting.   Genitourinary:  Positive for dysuria and frequency. Negative for bladder incontinence and hematuria.   Musculoskeletal:  Positive for back pain and neck pain.   Neurological:  Positive for tingling and numbness.     Patient Active Problem List   Diagnosis    Arthritis of left knee    Bilateral chronic knee pain    BPH with obstruction/lower urinary tract symptoms    Cervical disc herniation    Chronic low back pain    Chronic neck pain    Depression    Dyslipidemia    Epigastric pain    Essential hypertension    Facet arthritis of lumbar region    Family history of colonic polyps    GERD (gastroesophageal reflux disease)    Medial meniscus tear    MARIA DOLORES on CPAP    Primary osteoarthritis of left knee    Pseudoarthrosis of cervical spine    S/P cervical spinal fusion    Spondylolisthesis of cervicothoracic region    Aftercare following joint replacement surgery    Encounter for removal of sutures    History of falling    Major depressive disorder, single episode, unspecified    Personal history of nicotine dependence    Polyneuropathy, unspecified    Presence of left artificial knee joint    At risk for anaphylaxis    Body mass index (BMI) of 22.0-22.9 in adult    Cannabis abuse, uncomplicated    Nausea and vomiting    Routine health maintenance    Muscle pain       Social History     Socioeconomic History    Marital status:    Tobacco Use    Smoking status: Former     Packs/day: 0.75     Types: Cigarettes     Start date: 1981     Quit date: 6/10/2005     Years since quittin.9    Smokeless tobacco: Never   Vaping Use    Vaping Use: Never used   Substance and Sexual Activity    Alcohol use: Never    Drug use: Yes     Types: Marijuana    Sexual activity: Defer       Allergies   Allergen Reactions    Fish-Derived Products Anaphylaxis    Shellfish-Derived Products Anaphylaxis    Trazodone Unknown - Low Severity     Jittery       Current Outpatient Medications on  "File Prior to Visit   Medication Sig Dispense Refill    gabapentin (Neurontin) 600 MG tablet Take 1 tablet by mouth 3 (Three) Times a Day. 270 tablet 1    lidocaine (LIDODERM) 5 % Place 1 patch on the skin as directed by provider Daily. Remove & Discard patch within 12 hours or as directed by MD 30 patch 12    meclizine (ANTIVERT) 25 MG tablet TAKE 1 TABLET BY MOUTH THREE TIMES DAILY AS NEEDED FOR DIZZINESS 90 tablet 2    meloxicam (MOBIC) 15 MG tablet Take 1 tablet by mouth Daily As Needed (pain). 90 tablet 3    methocarbamol (ROBAXIN) 750 MG tablet Take 1 tablet by mouth 3 (Three) Times a Day As Needed for Muscle Spasms. 90 tablet 3    pantoprazole (PROTONIX) 40 MG EC tablet Take 1 tablet by mouth Daily. 90 tablet 3    promethazine (PHENERGAN) 12.5 MG tablet Take 1 tablet by mouth Every 8 (Eight) Hours As Needed for Nausea. Uses sparingly 24 tablet 2    sucralfate (Carafate) 1 g tablet Take 1 tablet by mouth 4 (Four) Times a Day. 120 tablet 5    tadalafil (Cialis) 5 MG tablet Take 1 tablet by mouth Daily As Needed for Erectile Dysfunction. 90 tablet 3    venlafaxine XR (EFFEXOR-XR) 150 MG 24 hr capsule Take 1 capsule by mouth Daily. 30 capsule 5     Current Facility-Administered Medications on File Prior to Visit   Medication Dose Route Frequency Provider Last Rate Last Admin    nitroglycerin (NITROSTAT) SL tablet 0.4 mg  0.4 mg Sublingual Q5 Min PRN Kahlil Mcmanus MD        sodium chloride 0.9 % flush 10 mL  10 mL Intravenous PRN Kahlil Mcmanus MD           Objective   Vitals:    05/25/23 1534   BP: 112/60   Pulse: 52   Resp: 18   Temp: 96.8 °F (36 °C)   TempSrc: Temporal   SpO2: 98%   Weight: 64.9 kg (143 lb)   Height: 170.2 cm (67\")   PainSc: 0-No pain     Body mass index is 22.4 kg/m².    Physical Exam  Vitals and nursing note reviewed.   Constitutional:       Appearance: He is well-developed.   HENT:      Head: Normocephalic and atraumatic.   Neck:      Thyroid: No thyromegaly.      Vascular: No JVD. "   Cardiovascular:      Rate and Rhythm: Normal rate and regular rhythm.      Heart sounds: Normal heart sounds. No murmur heard.    No friction rub. No gallop.   Pulmonary:      Effort: Pulmonary effort is normal. No respiratory distress.      Breath sounds: Normal breath sounds. No wheezing or rales.   Abdominal:      General: Bowel sounds are normal. There is no distension.      Palpations: Abdomen is soft.      Tenderness: There is no abdominal tenderness. There is no guarding or rebound.   Genitourinary:     Prostate: Enlarged and tender.      Rectum: Normal anal tone.   Musculoskeletal:      Cervical back: Neck supple. Pain with movement and muscular tenderness present. No spinous process tenderness.      Lumbar back: Spasms and tenderness present. Decreased range of motion.   Skin:     General: Skin is warm and dry.   Neurological:      Mental Status: He is alert.   Psychiatric:         Behavior: Behavior normal.       Assessment & Plan   Diagnoses and all orders for this visit:    1. Chronic bilateral low back pain with bilateral sciatica (Primary)  -     Ambulatory Referral to Pain Management Clinic    2. Chronic prostatitis  -     doxycycline (VIBRAMYCIN) 100 MG capsule; Take 1 capsule by mouth 2 (Two) Times a Day.  Dispense: 84 capsule; Refill: 0  -     tamsulosin (FLOMAX) 0.4 MG capsule 24 hr capsule; Take 1 capsule by mouth Daily.  Dispense: 30 capsule; Refill: 5  -     PSA DIAGNOSTIC  -     Ambulatory Referral to Urology  -     Urinalysis With Microscopic - Urine, Clean Catch    3. Other fatigue  -     Comprehensive Metabolic Panel  -     CBC & Differential  -     TSH  -     Vitamin B12  -     Testosterone, Free, Total    4. Dyslipidemia  -     Lipid Panel    5. Benign prostatic hyperplasia with urinary frequency  -     PSA DIAGNOSTIC  -     Ambulatory Referral to Urology    6. S/P cervical spinal fusion  -     CT cervical spine wo contrast; Future    7. Cervical radiculopathy  -     CT cervical spine  wo contrast; Future    8. Spondylolisthesis of cervicothoracic region  -     CT cervical spine wo contrast; Future    9. Lumbar radiculopathy  -     CT lumbar spine wo contrast; Future    Other orders  -     Microscopic Examination -    Severe neck and back pain, on gabapentin, meloxicam, MR;   not helping;   will order CT andr efer to pain mgmt;  Prostate infection - will give abx;   refer to urology  Due check lipids           BMI is within normal parameters. No other follow-up for BMI required.     -Follow up: 3 months and prn

## 2023-05-28 LAB
ALBUMIN SERPL-MCNC: 4.3 G/DL (ref 3.8–4.9)
ALBUMIN/GLOB SERPL: 1.7 {RATIO} (ref 1.2–2.2)
ALP SERPL-CCNC: 54 IU/L (ref 44–121)
ALT SERPL-CCNC: 10 IU/L (ref 0–44)
APPEARANCE UR: CLEAR
AST SERPL-CCNC: 18 IU/L (ref 0–40)
BACTERIA #/AREA URNS HPF: NORMAL /[HPF]
BASOPHILS # BLD AUTO: 0.1 X10E3/UL (ref 0–0.2)
BASOPHILS NFR BLD AUTO: 2 %
BILIRUB SERPL-MCNC: 0.3 MG/DL (ref 0–1.2)
BILIRUB UR QL STRIP: NEGATIVE
BUN SERPL-MCNC: 9 MG/DL (ref 6–24)
BUN/CREAT SERPL: 8 (ref 9–20)
CALCIUM SERPL-MCNC: 8.9 MG/DL (ref 8.7–10.2)
CASTS URNS QL MICRO: NORMAL /LPF
CHLORIDE SERPL-SCNC: 104 MMOL/L (ref 96–106)
CHOLEST SERPL-MCNC: 142 MG/DL (ref 100–199)
CO2 SERPL-SCNC: 25 MMOL/L (ref 20–29)
COLOR UR: YELLOW
CREAT SERPL-MCNC: 1.11 MG/DL (ref 0.76–1.27)
EGFRCR SERPLBLD CKD-EPI 2021: 77 ML/MIN/1.73
EOSINOPHIL # BLD AUTO: 0.5 X10E3/UL (ref 0–0.4)
EOSINOPHIL NFR BLD AUTO: 9 %
EPI CELLS #/AREA URNS HPF: NORMAL /HPF (ref 0–10)
ERYTHROCYTE [DISTWIDTH] IN BLOOD BY AUTOMATED COUNT: 13 % (ref 11.6–15.4)
GLOBULIN SER CALC-MCNC: 2.5 G/DL (ref 1.5–4.5)
GLUCOSE SERPL-MCNC: 83 MG/DL (ref 70–99)
GLUCOSE UR QL STRIP: NEGATIVE
HCT VFR BLD AUTO: 36.5 % (ref 37.5–51)
HDLC SERPL-MCNC: 48 MG/DL
HGB BLD-MCNC: 11.8 G/DL (ref 13–17.7)
HGB UR QL STRIP: NEGATIVE
IMM GRANULOCYTES # BLD AUTO: 0 X10E3/UL (ref 0–0.1)
IMM GRANULOCYTES NFR BLD AUTO: 0 %
KETONES UR QL STRIP: NEGATIVE
LDLC SERPL CALC-MCNC: 78 MG/DL (ref 0–99)
LEUKOCYTE ESTERASE UR QL STRIP: NEGATIVE
LYMPHOCYTES # BLD AUTO: 2.8 X10E3/UL (ref 0.7–3.1)
LYMPHOCYTES NFR BLD AUTO: 44 %
MCH RBC QN AUTO: 32 PG (ref 26.6–33)
MCHC RBC AUTO-ENTMCNC: 32.3 G/DL (ref 31.5–35.7)
MCV RBC AUTO: 99 FL (ref 79–97)
MICRO URNS: ABNORMAL
MICRO URNS: ABNORMAL
MONOCYTES # BLD AUTO: 0.6 X10E3/UL (ref 0.1–0.9)
MONOCYTES NFR BLD AUTO: 10 %
NEUTROPHILS # BLD AUTO: 2.2 X10E3/UL (ref 1.4–7)
NEUTROPHILS NFR BLD AUTO: 35 %
NITRITE UR QL STRIP: NEGATIVE
PH UR STRIP: 8.5 [PH] (ref 5–7.5)
PLATELET # BLD AUTO: 238 X10E3/UL (ref 150–450)
POTASSIUM SERPL-SCNC: 4.7 MMOL/L (ref 3.5–5.2)
PROT SERPL-MCNC: 6.8 G/DL (ref 6–8.5)
PROT UR QL STRIP: NEGATIVE
PSA SERPL-MCNC: 0.6 NG/ML (ref 0–4)
RBC # BLD AUTO: 3.69 X10E6/UL (ref 4.14–5.8)
RBC #/AREA URNS HPF: NORMAL /HPF (ref 0–2)
SODIUM SERPL-SCNC: 141 MMOL/L (ref 134–144)
SP GR UR STRIP: 1.02 (ref 1–1.03)
TESTOST FREE SERPL-MCNC: 5.2 PG/ML (ref 7.2–24)
TESTOST SERPL-MCNC: 1018 NG/DL (ref 264–916)
TRIGL SERPL-MCNC: 81 MG/DL (ref 0–149)
TSH SERPL DL<=0.005 MIU/L-ACNC: 1.94 UIU/ML (ref 0.45–4.5)
UROBILINOGEN UR STRIP-MCNC: 1 MG/DL (ref 0.2–1)
VIT B12 SERPL-MCNC: 346 PG/ML (ref 232–1245)
VLDLC SERPL CALC-MCNC: 16 MG/DL (ref 5–40)
WBC # BLD AUTO: 6.3 X10E3/UL (ref 3.4–10.8)
WBC #/AREA URNS HPF: NORMAL /HPF (ref 0–5)

## 2023-05-29 NOTE — PROGRESS NOTES
Call results to patient.  Testosterone is very high, I would stop if taking as worsen potentially any prostate issues and also induce cardiac event.  B12 lower normal - start b12 1000mcg IM monthly (can come to our office or send to pharmacy).   See if helps fatigue  Kidney and liver function ok  Mildly anemic - start multivitamin, folic acid and try b12 injections see if improves  Cholesterol controlled  PSA ok;  Take antibiotics for prostate.  If no relief, would suggest sending to new urologist.

## 2023-05-30 ENCOUNTER — TELEPHONE (OUTPATIENT)
Dept: FAMILY MEDICINE CLINIC | Facility: CLINIC | Age: 58
End: 2023-05-30

## 2023-05-30 DIAGNOSIS — R53.83 OTHER FATIGUE: Primary | ICD-10-CM

## 2023-05-30 DIAGNOSIS — D53.9 MACROCYTIC ANEMIA: ICD-10-CM

## 2023-05-30 RX ORDER — CYANOCOBALAMIN 1000 UG/ML
INJECTION, SOLUTION INTRAMUSCULAR; SUBCUTANEOUS
Qty: 1 ML | Refills: 12 | Status: SHIPPED | OUTPATIENT
Start: 2023-05-30 | End: 2023-06-02 | Stop reason: ALTCHOICE

## 2023-05-30 RX ORDER — SYRINGE W-NEEDLE,DISPOSAB,3 ML 25GX5/8"
SYRINGE, EMPTY DISPOSABLE MISCELLANEOUS
COMMUNITY
End: 2023-05-30 | Stop reason: SDUPTHER

## 2023-05-30 RX ORDER — CYANOCOBALAMIN 1000 UG/ML
1000 INJECTION, SOLUTION INTRAMUSCULAR; SUBCUTANEOUS ONCE
COMMUNITY
End: 2023-05-30 | Stop reason: SDUPTHER

## 2023-05-30 RX ORDER — FOLIC ACID 1 MG/1
1 TABLET ORAL DAILY
Qty: 90 TABLET | Refills: 1 | Status: SHIPPED | OUTPATIENT
Start: 2023-05-30

## 2023-05-30 RX ORDER — SYRINGE W-NEEDLE,DISPOSAB,3 ML 25GX5/8"
SYRINGE, EMPTY DISPOSABLE MISCELLANEOUS
Qty: 25 EACH | Refills: 0 | Status: SHIPPED | OUTPATIENT
Start: 2023-05-30

## 2023-05-30 RX ORDER — FOLIC ACID 1 MG/1
1 TABLET ORAL DAILY
COMMUNITY
End: 2023-05-30 | Stop reason: SDUPTHER

## 2023-06-02 ENCOUNTER — CLINICAL SUPPORT (OUTPATIENT)
Dept: FAMILY MEDICINE CLINIC | Facility: CLINIC | Age: 58
End: 2023-06-02

## 2023-06-02 DIAGNOSIS — E53.8 B12 DEFICIENCY: Primary | ICD-10-CM

## 2023-06-02 PROCEDURE — 96372 THER/PROPH/DIAG INJ SC/IM: CPT | Performed by: FAMILY MEDICINE

## 2023-06-02 RX ORDER — CYANOCOBALAMIN 1000 UG/ML
1000 INJECTION, SOLUTION INTRAMUSCULAR; SUBCUTANEOUS
Status: SHIPPED | OUTPATIENT
Start: 2023-06-02 | End: 2023-11-17

## 2023-06-02 RX ADMIN — CYANOCOBALAMIN 1000 MCG: 1000 INJECTION, SOLUTION INTRAMUSCULAR; SUBCUTANEOUS at 16:22

## 2023-06-06 ENCOUNTER — TELEPHONE (OUTPATIENT)
Dept: FAMILY MEDICINE CLINIC | Facility: CLINIC | Age: 58
End: 2023-06-06

## 2023-06-06 DIAGNOSIS — M54.12 CERVICAL RADICULOPATHY: ICD-10-CM

## 2023-06-06 DIAGNOSIS — Z98.1 S/P CERVICAL SPINAL FUSION: ICD-10-CM

## 2023-06-06 DIAGNOSIS — M54.41 CHRONIC BILATERAL LOW BACK PAIN WITH BILATERAL SCIATICA: Primary | ICD-10-CM

## 2023-06-06 DIAGNOSIS — M43.13 SPONDYLOLISTHESIS OF CERVICOTHORACIC REGION: ICD-10-CM

## 2023-06-06 DIAGNOSIS — G89.29 CHRONIC BILATERAL LOW BACK PAIN WITH BILATERAL SCIATICA: Primary | ICD-10-CM

## 2023-06-06 DIAGNOSIS — M54.42 CHRONIC BILATERAL LOW BACK PAIN WITH BILATERAL SCIATICA: Primary | ICD-10-CM

## 2023-06-06 DIAGNOSIS — M54.16 LUMBAR RADICULOPATHY: ICD-10-CM

## 2023-06-06 NOTE — TELEPHONE ENCOUNTER
Caller: Marvel Kaiser    Relationship: Self    Best call back number: [phone]     What is the best time to reach you: ANY TIME    Who are you requesting to speak with (clinical staff, provider,  specific staff member): CLINICAL STAFF    What was the call regarding: PATIENT CALLED STATING HE WAS SEEN ON 5/25/23 AND REFERRED TO PAIN MANAGEMENT. HE ADVISED DR DE LA PAZ HE WAS NOT HAPPY WITH DR CARSON AND DID NOT WANT TO RETURN TO SEE HIM. DR DE LA PAZ DID REFER HIM BACK TO DR CARSON THOUGH AND PATIENT HAS AN APPOINTMENT ON 6/15/23 AND HE WILL GO FOR THE FIRST APPOINTMENT BUT HE WILL NOT RETURN DUE TO HIM NOT LIKING THAT OFFICE OR PROVIDER. HE WOULD LIKE A NEW REFERRAL TO BE PUT IN AND NOT TO ANYONE AT DR. CARSON'S OFFICE AT ALL BUT A DIFFERENT OFFICE ALL TOGETHER.    PLEASE ADVISE    Is it okay if the provider responds through MyChart: NO, PHONE CALL

## 2023-06-13 DIAGNOSIS — M43.13 SPONDYLOLISTHESIS OF CERVICOTHORACIC REGION: ICD-10-CM

## 2023-06-13 RX ORDER — GABAPENTIN 600 MG/1
600 TABLET ORAL 3 TIMES DAILY
Qty: 270 TABLET | Refills: 1 | OUTPATIENT
Start: 2023-06-13

## 2023-06-13 NOTE — TELEPHONE ENCOUNTER
Caller: Kaiser Grier    Relationship: Self    Best call back number: [phone]     Requested Prescriptions:   Requested Prescriptions     Pending Prescriptions Disp Refills    gabapentin (Neurontin) 600 MG tablet 270 tablet 1     Sig: Take 1 tablet by mouth 3 (Three) Times a Day.        Pharmacy where request should be sent: [id number] - [place], [place] - [address] [address] AT [facility] [address] [address]  - [phone]  - [phone] FX     Last office visit with prescribing clinician: 5/25/2023   Last telemedicine visit with prescribing clinician: Visit date not found   Next office visit with prescribing clinician: 6/13/2023     Additional details provided by patient: PATIENT STATES HE IS COMPLETELY OUT    Does the patient have less than a 3 day supply:  [x] Yes  [] No    Would you like a call back once the refill request has been completed: [x] Yes [] No    If the office needs to give you a call back, can they leave a voicemail: [x] Yes [] No    Trell Junior   06/13/23 14:33 EDT

## 2023-06-19 ENCOUNTER — TELEPHONE (OUTPATIENT)
Dept: FAMILY MEDICINE CLINIC | Facility: CLINIC | Age: 58
End: 2023-06-19

## 2023-06-19 NOTE — TELEPHONE ENCOUNTER
Caller: Kaiser Grier    Relationship: Self    Best call back number: [phone]     What is the best time to reach you: ANY TIME    Who are you requesting to speak with (clinical staff, provider,  specific staff member): CLINICAL STAFF    What was the call regarding: PATIENT STATES THAT HE WOULD LIKE A CALLBACK TO DISCUSS WHEN HE SHOULD START SEEING IMPROVEMENTS IN HIS ENERGY LEVEL FROM TAKING B12 SHOTS.  HE SAYS HE IS STILL VERY TIRED.    Is it okay if the provider responds through [employer]:     PLEASE ADVISE.

## 2023-07-13 PROBLEM — Z47.1 AFTERCARE FOLLOWING JOINT REPLACEMENT SURGERY: Status: RESOLVED | Noted: 2020-06-17 | Resolved: 2023-07-13

## 2023-07-13 PROBLEM — Z48.02 ENCOUNTER FOR REMOVAL OF SUTURES: Status: RESOLVED | Noted: 2020-06-19 | Resolved: 2023-07-13

## 2023-07-13 PROBLEM — F12.10 CANNABIS ABUSE, UNCOMPLICATED: Status: RESOLVED | Noted: 2022-05-16 | Resolved: 2023-07-13

## 2023-07-13 PROBLEM — Z00.00 ROUTINE HEALTH MAINTENANCE: Status: RESOLVED | Noted: 2022-05-16 | Resolved: 2023-07-13

## 2023-07-14 ENCOUNTER — TELEPHONE (OUTPATIENT)
Dept: INTERNAL MEDICINE | Facility: CLINIC | Age: 58
End: 2023-07-14

## 2023-07-14 NOTE — TELEPHONE ENCOUNTER
Caller: PRISCILA- [facility]    Relationship: Payer    Best call back number: [phone]     What was the call regarding: PRISCILA WITH [facility] IS CALLING REGARDING tadalafil (Cialis) 5 MG tablet. PRISCILA STATED THEY ARE TRYING TO VERIFY IF PATIENT HAS TRIED A FORMULARY OR IF HE HAS A MEDICAL TO REASONING TO AVOID TAKING THEM. PRISCILA STATED THE FORMULARIES THEY HAVE ARE ALFUSOZIN, FINASTERIDE, TAMSULOSIN, COMBINATION DUTASTERIDE-TAMSULOSIN, OR DUTASTERIDE. PRISCILA STATED THIS IS URGENT AND IS ASKING FOR A RESPONSE AT YOUR EARLIEST CONVENIENCE. PLEASE ADVISE.

## 2023-07-14 NOTE — TELEPHONE ENCOUNTER
He already is on Tamsulosin. This patient has been on Cialis for a while now, this is not a new medication.

## 2023-08-20 NOTE — PROGRESS NOTES
Chief Complaint: Urologic complaint    Subjective         History of Present Illness  Kaiser Grier is a 58 y.o. male           BPH with LUTS  ED    Nocturia x3, intermittency.  Sensation incomplete emptying.  Strain sometimes.      Did doxycycline x3 weeks for possible prostatitis    Currently on Flomax 0.4 mg daily.    On tadalafil 5 mg daily.  Not helping much with ED    Trimix - did not help.    QOL  6    Some trouble with ED. Worried about htis    Will not do office cystoscopy or TRUS    No GH     No history of kidney  stone.    No urologic family history   no history of urologic surgery.    No cardiopulmonary history   Non-smoker.marijuna  No anticoagulation      5/23    UA-negative, 1.1, GFR 77        PSA    5/23    0.6  5/20    0.39    IPSS - 26  QOL - 5      Objective     Past Medical History:   Diagnosis Date    Aftercare following joint replacement surgery 06/17/2020    Anxiety     Body mass index (BMI) of 22.0-22.9 in adult 05/16/2022    Last Assessment & Plan:   Formatting of this note might be different from the original.  Condition: stable     Educated patient on normal BMI range of 18.5 to 24.9     Advised to monitor nutrition to not exceed caloric needs, or as indicated by PCP in order to maintain a healthy weight and BMI.     Advised to engage in aerobic physical activity, if indicated to be safe by PCP, to assist with maint    BPH with obstruction/lower urinary tract symptoms 08/07/2019    Cannabis abuse, uncomplicated 05/16/2022    Formatting of this note might be different from the original.  Member states that he uses marijuana for back pain. MD has stoppled his lortabs for now     Last Assessment & Plan:   Formatting of this note might be different from the original.  Condition: stable     Follow up in: one week    Cervical disc herniation 02/15/2017    Chronic low back pain 05/11/2021    Encounter for removal of sutures 06/19/2020    Erectile dysfunction 2-2-2020    False positive cardiac  "stress test     2018, essentially normal coronaries    GERD (gastroesophageal reflux disease) 05/11/2021    Hypertension     Major depressive disorder, single episode, unspecified 09/29/2012    MARIA DOLORES on CPAP 11/19/2019    Routine health maintenance 05/16/2022    Last Assessment & Plan:   Formatting of this note might be different from the original.  Condition: stable     Follow up in: three months       Past Surgical History:   Procedure Laterality Date    JOINT REPLACEMENT Left     KNEE    SPINE SURGERY           Current Outpatient Medications:     B-D INSULIN SYRINGE 1CC/25GX1\" 25G X 1\" 1 ML misc, USE WITH B12 INJECTIONS, Disp: , Rfl:     cyanocobalamin 1000 MCG/ML injection, Inject 1 mL into the appropriate muscle as directed by prescriber Every 30 (Thirty) Days., Disp: 1 mL, Rfl: 8    doxycycline (VIBRAMYCIN) 100 MG capsule, Take 1 capsule by mouth 2 (Two) Times a Day., Disp: 84 capsule, Rfl: 0    folic acid (FOLVITE) 1 MG tablet, Take 1 tablet by mouth Daily., Disp: 90 tablet, Rfl: 1    gabapentin (Neurontin) 600 MG tablet, Take 1 tablet by mouth 3 (Three) Times a Day., Disp: 270 tablet, Rfl: 1    meclizine (ANTIVERT) 25 MG tablet, TAKE 1 TABLET BY MOUTH THREE TIMES DAILY AS NEEDED FOR DIZZINESS, Disp: 90 tablet, Rfl: 2    meloxicam (MOBIC) 15 MG tablet, Take 1 tablet by mouth Daily As Needed (pain)., Disp: 90 tablet, Rfl: 3    pantoprazole (PROTONIX) 40 MG EC tablet, Take 1 tablet by mouth Daily., Disp: 90 tablet, Rfl: 3    promethazine (PHENERGAN) 12.5 MG tablet, Take 1 tablet by mouth Every 8 (Eight) Hours As Needed for Nausea. Uses sparingly, Disp: 24 tablet, Rfl: 2    Syringe 25G X 1\" 3 ML misc, To use with b12 injections, Disp: 25 each, Rfl: 0    tadalafil (Cialis) 5 MG tablet, Take 1 tablet by mouth Daily As Needed for Erectile Dysfunction., Disp: 90 tablet, Rfl: 3    tamsulosin (FLOMAX) 0.4 MG capsule 24 hr capsule, Take 1 capsule by mouth Daily., Disp: 30 capsule, Rfl: 5    Current Facility-Administered " Medications:     cyanocobalamin injection 1,000 mcg, 1,000 mcg, Intramuscular, Q28 Days, Keaton Bach DO, 1,000 mcg at 23 1622    nitroglycerin (NITROSTAT) SL tablet 0.4 mg, 0.4 mg, Sublingual, Q5 Min PRN, Kahlil Mcmanus MD    sodium chloride 0.9 % flush 10 mL, 10 mL, Intravenous, PRN, Kahlil Mcmanus MD    Allergies   Allergen Reactions    Fish-Derived Products Anaphylaxis    Shellfish-Derived Products Anaphylaxis    Trazodone Unknown - Low Severity     Jittery        Family History   Problem Relation Age of Onset    Heart disease Mother     Hypertension Mother     Dementia Mother     Alzheimer's disease Mother     Heart disease Father     Hypertension Father     Diabetes Sister     Diabetes Brother     Dementia Maternal Grandmother     Alzheimer's disease Maternal Grandmother     Prostate cancer Paternal Grandfather     Dementia Sister        Social History     Socioeconomic History    Marital status:    Tobacco Use    Smoking status: Former     Packs/day: 0.08     Years: 4.00     Pack years: 0.32     Types: Cigarettes     Start date: 1981     Quit date: 6/10/2005     Years since quittin.2    Smokeless tobacco: Never   Vaping Use    Vaping Use: Never used   Substance and Sexual Activity    Alcohol use: Not Currently     Alcohol/week: 2.0 standard drinks     Types: 2 Cans of beer per week    Drug use: Not Currently     Types: Cocaine(coke), Marijuana    Sexual activity: Yes     Partners: Female     Birth control/protection: None       Vital Signs:   There were no vitals taken for this visit.     Physical exam    Alert and orient x3  Well appearing, well developed, in no acute distress   Unlabored respirations  Nontender/nondistended      Grossly oriented to person, place and time, judgment is intact, normal mood and affect    Results for orders placed or performed in visit on 23   POC Urinalysis Dipstick, Automated    Specimen: Urine   Result Value Ref Range    Color Yellow Yellow,  Straw, Dark Yellow, Dora    Clarity, UA Clear Clear    Specific Gravity  1.030 1.005 - 1.030    pH, Urine 7.5 5.0 - 8.0    Leukocytes Negative Negative    Nitrite, UA Negative Negative    Protein, POC Negative Negative mg/dL    Glucose, UA Negative Negative mg/dL    Ketones, UA Negative Negative    Urobilinogen, UA Normal Normal, 0.2 E.U./dL    Bilirubin Negative Negative    Blood, UA Negative Negative    Lot Number [id number]     Expiration Date 07/31/2024              Assessment and Plan    Diagnoses and all orders for this visit:    1. Benign prostatic hyperplasia with lower urinary tract symptoms, symptom details unspecified (Primary)         Records reviewed today and summarized in chart      Patient having a lot of trouble with voiding we are going to work him up for a BPH procedure        Secondary to anxiety patient cannot do procedures at the office    Get him set up for cystoscopy with TRUS volume study at the hospital.   Risks and benefits were discussed including bleeding, infection and damage to the urinary system.  We also discussed the risk of anesthesia up to and including death.  Patient voiced understanding and would like to proceed.    Qmax

## 2023-08-22 ENCOUNTER — PREP FOR SURGERY (OUTPATIENT)
Dept: OTHER | Facility: HOSPITAL | Age: 58
End: 2023-08-22
Payer: MEDICARE

## 2023-08-22 ENCOUNTER — OFFICE VISIT (OUTPATIENT)
Dept: UROLOGY | Facility: CLINIC | Age: 58
End: 2023-08-22
Payer: MEDICARE

## 2023-08-22 VITALS — HEIGHT: 67 IN | WEIGHT: 142 LBS | BODY MASS INDEX: 22.29 KG/M2

## 2023-08-22 DIAGNOSIS — N40.1 BENIGN PROSTATIC HYPERPLASIA WITH LOWER URINARY TRACT SYMPTOMS, SYMPTOM DETAILS UNSPECIFIED: Primary | ICD-10-CM

## 2023-08-22 DIAGNOSIS — N40.0 BPH WITHOUT OBSTRUCTION/LOWER URINARY TRACT SYMPTOMS: Primary | ICD-10-CM

## 2023-08-22 DIAGNOSIS — R30.0 DYSURIA: ICD-10-CM

## 2023-08-22 PROBLEM — R39.15 BENIGN PROSTATIC HYPERPLASIA (BPH) WITH URINARY URGENCY: Status: ACTIVE | Noted: 2023-08-22

## 2023-08-22 LAB — URINE VOLUME: NORMAL

## 2023-08-22 RX ORDER — LEVOFLOXACIN 5 MG/ML
500 INJECTION, SOLUTION INTRAVENOUS ONCE
OUTPATIENT
Start: 2023-08-22 | End: 2023-08-22

## 2023-08-22 RX ORDER — SODIUM CHLORIDE 9 MG/ML
40 INJECTION, SOLUTION INTRAVENOUS AS NEEDED
OUTPATIENT
Start: 2023-08-22

## 2023-08-22 RX ORDER — SODIUM CHLORIDE 0.9 % (FLUSH) 0.9 %
3 SYRINGE (ML) INJECTION EVERY 12 HOURS SCHEDULED
OUTPATIENT
Start: 2023-08-22

## 2023-08-22 RX ORDER — SODIUM CHLORIDE 9 MG/ML
100 INJECTION, SOLUTION INTRAVENOUS CONTINUOUS
OUTPATIENT
Start: 2023-08-22

## 2023-08-22 RX ORDER — SODIUM CHLORIDE 0.9 % (FLUSH) 0.9 %
10 SYRINGE (ML) INJECTION AS NEEDED
OUTPATIENT
Start: 2023-08-22

## 2023-08-24 ENCOUNTER — TELEPHONE (OUTPATIENT)
Dept: SURGERY | Facility: CLINIC | Age: 58
End: 2023-08-24
Payer: MEDICARE

## 2023-08-24 NOTE — TELEPHONE ENCOUNTER
Dr. Martinez's office is calling to let you know that the patient was scheduled today with them for a Uroflow and he did not show for this appointment.

## 2023-08-25 NOTE — TELEPHONE ENCOUNTER
CALLED PT WHO APOLOGIZED FOR MISSING HIS SCHEDULED UROFLOW APPOINTMENT AT [facility]'S OFFICE. PER PATIENT, HE WAS CALLED INTO WORK.     REMINDED PATIENT THAT IN ORDER TO HAVE HIS AQUABLATION PROCEDURE DONE WITH NEGRITA, HE NEEDS TO COMPLETE UROFLOW PRIOR. PATIENT UNDERSTOOD AND AGREED TO RESCHEDULE.    TRANSFERRED CALL TO [facility]'S OFFICE TO HAVE PATIENT R/S FOR UROFLOW.    Future Appointments         Provider Department Center    8/31/2023 10:45 AM Bre Martinez MD [facility] UROLOGY [facility]    8/31/2023 2:00 PM [facility] CT 3 [facility] CT [facility]    9/14/2023 9:45 AM Nery Herr APRN [facility] PRIMARY CARE [facility]

## 2023-08-31 ENCOUNTER — PROCEDURE VISIT (OUTPATIENT)
Dept: UROLOGY | Facility: CLINIC | Age: 58
End: 2023-08-31
Payer: MEDICARE

## 2023-08-31 ENCOUNTER — HOSPITAL ENCOUNTER (OUTPATIENT)
Dept: CT IMAGING | Facility: HOSPITAL | Age: 58
Discharge: HOME | End: 2023-08-31
Admitting: FAMILY MEDICINE
Payer: MEDICARE

## 2023-08-31 DIAGNOSIS — N40.1 BPH WITH OBSTRUCTION/LOWER URINARY TRACT SYMPTOMS: Primary | ICD-10-CM

## 2023-08-31 DIAGNOSIS — N13.8 BPH WITH OBSTRUCTION/LOWER URINARY TRACT SYMPTOMS: Primary | ICD-10-CM

## 2023-08-31 DIAGNOSIS — M54.16 LUMBAR RADICULOPATHY: ICD-10-CM

## 2023-08-31 DIAGNOSIS — M54.12 CERVICAL RADICULOPATHY: ICD-10-CM

## 2023-08-31 DIAGNOSIS — M43.13 SPONDYLOLISTHESIS OF CERVICOTHORACIC REGION: ICD-10-CM

## 2023-08-31 DIAGNOSIS — Z98.1 S/P CERVICAL SPINAL FUSION: ICD-10-CM

## 2023-08-31 PROCEDURE — 72125 CT NECK SPINE W/O DYE: CPT

## 2023-08-31 PROCEDURE — 72131 CT LUMBAR SPINE W/O DYE: CPT

## 2023-08-31 NOTE — PROGRESS NOTES
Uroflow.    Q-Gino.  14.3 mL/s    Average flow.  7.5 mm/s    Voided volume.  129 mL    Interruptions.  3    Patient voiding volume is low and I question him about the volume and patient states that this is his normal volume.    Patient has obstructive uropathy.  Further treatment is deferred to Dr. Gianni Rosenberg

## 2023-09-06 ENCOUNTER — TELEPHONE (OUTPATIENT)
Dept: UROLOGY | Facility: CLINIC | Age: 58
End: 2023-09-06
Payer: MEDICARE

## 2023-09-07 ENCOUNTER — LAB (OUTPATIENT)
Dept: LAB | Facility: HOSPITAL | Age: 58
End: 2023-09-07
Payer: MEDICARE

## 2023-09-07 DIAGNOSIS — N40.1 BENIGN PROSTATIC HYPERPLASIA WITH LOWER URINARY TRACT SYMPTOMS, SYMPTOM DETAILS UNSPECIFIED: ICD-10-CM

## 2023-09-07 DIAGNOSIS — R30.0 DYSURIA: ICD-10-CM

## 2023-09-07 PROCEDURE — 87086 URINE CULTURE/COLONY COUNT: CPT

## 2023-09-09 LAB — BACTERIA SPEC AEROBE CULT: NO GROWTH

## 2023-09-12 ENCOUNTER — TELEPHONE (OUTPATIENT)
Dept: INTERNAL MEDICINE | Facility: CLINIC | Age: 58
End: 2023-09-12
Payer: MEDICARE

## 2023-09-12 NOTE — TELEPHONE ENCOUNTER
----- Message from LAUREEN Cook sent at 9/5/2023 11:06 AM EDT -----  Please call pt-      Mr. Grier,     I have reviewed your recent CT imaging. Your cervical spine showed no acute abnormalities that is determinate of your neck pain, other than your previous surgical elements.Your lumbar spine did show some disc bulging at L4-S1. According to radiology we can consider a MRI for a closer look, but I would be more inclined to refer you back to Neurosurgery to discuss options for management. Let me know if this interests you and I would be more than happy to place a referral if needed.      Stay well,     LAUREEN Mirza

## 2023-09-13 ENCOUNTER — HOSPITAL ENCOUNTER (OUTPATIENT)
Dept: ULTRASOUND IMAGING | Facility: HOSPITAL | Age: 58
Discharge: HOME | End: 2023-09-13
Payer: MEDICARE

## 2023-09-13 ENCOUNTER — ANESTHESIA (OUTPATIENT)
Dept: PERIOP | Facility: HOSPITAL | Age: 58
End: 2023-09-13
Payer: COMMERCIAL

## 2023-09-13 ENCOUNTER — HOSPITAL ENCOUNTER (OUTPATIENT)
Facility: HOSPITAL | Age: 58
Discharge: HOME | End: 2023-09-13
Attending: UROLOGY | Admitting: UROLOGY
Payer: COMMERCIAL

## 2023-09-13 ENCOUNTER — ANESTHESIA EVENT (OUTPATIENT)
Dept: PERIOP | Facility: HOSPITAL | Age: 58
End: 2023-09-13
Payer: COMMERCIAL

## 2023-09-13 VITALS
HEIGHT: 66 IN | RESPIRATION RATE: 18 BRPM | WEIGHT: 137.79 LBS | HEART RATE: 52 BPM | BODY MASS INDEX: 22.14 KG/M2 | TEMPERATURE: 97.2 F | DIASTOLIC BLOOD PRESSURE: 55 MMHG | SYSTOLIC BLOOD PRESSURE: 114 MMHG | OXYGEN SATURATION: 100 %

## 2023-09-13 DIAGNOSIS — M54.42 CHRONIC BILATERAL LOW BACK PAIN WITH BILATERAL SCIATICA: ICD-10-CM

## 2023-09-13 DIAGNOSIS — Z98.1 S/P CERVICAL SPINAL FUSION: ICD-10-CM

## 2023-09-13 DIAGNOSIS — N40.0 BPH WITHOUT OBSTRUCTION/LOWER URINARY TRACT SYMPTOMS: ICD-10-CM

## 2023-09-13 DIAGNOSIS — M54.2 CHRONIC NECK PAIN: Primary | ICD-10-CM

## 2023-09-13 DIAGNOSIS — M43.13 SPONDYLOLISTHESIS OF CERVICOTHORACIC REGION: ICD-10-CM

## 2023-09-13 DIAGNOSIS — G89.29 CHRONIC BILATERAL LOW BACK PAIN WITH BILATERAL SCIATICA: ICD-10-CM

## 2023-09-13 DIAGNOSIS — G89.29 CHRONIC NECK PAIN: Primary | ICD-10-CM

## 2023-09-13 DIAGNOSIS — M50.20 CERVICAL DISC HERNIATION: ICD-10-CM

## 2023-09-13 DIAGNOSIS — N40.1 BENIGN PROSTATIC HYPERPLASIA WITH LOWER URINARY TRACT SYMPTOMS, SYMPTOM DETAILS UNSPECIFIED: ICD-10-CM

## 2023-09-13 DIAGNOSIS — M54.41 CHRONIC BILATERAL LOW BACK PAIN WITH BILATERAL SCIATICA: ICD-10-CM

## 2023-09-13 PROCEDURE — 76999 ECHO EXAMINATION PROCEDURE: CPT

## 2023-09-13 PROCEDURE — 25010000002 MIDAZOLAM PER 1MG: Performed by: ANESTHESIOLOGY

## 2023-09-13 PROCEDURE — 52000 CYSTOURETHROSCOPY: CPT | Performed by: UROLOGY

## 2023-09-13 PROCEDURE — 25010000002 PROPOFOL 10 MG/ML EMULSION: Performed by: NURSE ANESTHETIST, CERTIFIED REGISTERED

## 2023-09-13 PROCEDURE — 76873 ECHOGRAP TRANS R PROS STUDY: CPT | Performed by: UROLOGY

## 2023-09-13 PROCEDURE — A9270 NON-COVERED ITEM OR SERVICE: HCPCS | Performed by: ANESTHESIOLOGY

## 2023-09-13 PROCEDURE — 25010000002 LEVOFLOXACIN PER 250 MG: Performed by: UROLOGY

## 2023-09-13 PROCEDURE — 63710000001 ACETAMINOPHEN EXTRA STRENGTH 500 MG TABLET: Performed by: ANESTHESIOLOGY

## 2023-09-13 RX ORDER — PHENYLEPHRINE HCL IN 0.9% NACL 1 MG/10 ML
SYRINGE (ML) INTRAVENOUS AS NEEDED
Status: DISCONTINUED | OUTPATIENT
Start: 2023-09-13 | End: 2023-09-13 | Stop reason: SURG

## 2023-09-13 RX ORDER — MIDAZOLAM HYDROCHLORIDE 2 MG/2ML
2 INJECTION, SOLUTION INTRAMUSCULAR; INTRAVENOUS ONCE
Status: COMPLETED | OUTPATIENT
Start: 2023-09-13 | End: 2023-09-13

## 2023-09-13 RX ORDER — MEPERIDINE HYDROCHLORIDE 25 MG/ML
12.5 INJECTION INTRAMUSCULAR; INTRAVENOUS; SUBCUTANEOUS
Status: DISCONTINUED | OUTPATIENT
Start: 2023-09-13 | End: 2023-09-13 | Stop reason: HOSPADM

## 2023-09-13 RX ORDER — ACETAMINOPHEN 325 MG/1
650 TABLET ORAL ONCE
Status: DISCONTINUED | OUTPATIENT
Start: 2023-09-13 | End: 2023-09-13 | Stop reason: HOSPADM

## 2023-09-13 RX ORDER — PROPOFOL 10 MG/ML
VIAL (ML) INTRAVENOUS AS NEEDED
Status: DISCONTINUED | OUTPATIENT
Start: 2023-09-13 | End: 2023-09-13 | Stop reason: SURG

## 2023-09-13 RX ORDER — ONDANSETRON 2 MG/ML
4 INJECTION INTRAMUSCULAR; INTRAVENOUS ONCE AS NEEDED
Status: DISCONTINUED | OUTPATIENT
Start: 2023-09-13 | End: 2023-09-13 | Stop reason: HOSPADM

## 2023-09-13 RX ORDER — SODIUM CHLORIDE, SODIUM LACTATE, POTASSIUM CHLORIDE, CALCIUM CHLORIDE 600; 310; 30; 20 MG/100ML; MG/100ML; MG/100ML; MG/100ML
9 INJECTION, SOLUTION INTRAVENOUS CONTINUOUS PRN
Status: DISCONTINUED | OUTPATIENT
Start: 2023-09-13 | End: 2023-09-13 | Stop reason: HOSPADM

## 2023-09-13 RX ORDER — PROMETHAZINE HYDROCHLORIDE 12.5 MG/1
12.5 TABLET ORAL ONCE AS NEEDED
Status: DISCONTINUED | OUTPATIENT
Start: 2023-09-13 | End: 2023-09-13 | Stop reason: HOSPADM

## 2023-09-13 RX ORDER — LIDOCAINE HYDROCHLORIDE 20 MG/ML
INJECTION, SOLUTION EPIDURAL; INFILTRATION; INTRACAUDAL; PERINEURAL AS NEEDED
Status: DISCONTINUED | OUTPATIENT
Start: 2023-09-13 | End: 2023-09-13 | Stop reason: SURG

## 2023-09-13 RX ORDER — SODIUM CHLORIDE 0.9 % (FLUSH) 0.9 %
10 SYRINGE (ML) INJECTION AS NEEDED
Status: DISCONTINUED | OUTPATIENT
Start: 2023-09-13 | End: 2023-09-13 | Stop reason: HOSPADM

## 2023-09-13 RX ORDER — DEXMEDETOMIDINE HYDROCHLORIDE 100 UG/ML
INJECTION, SOLUTION INTRAVENOUS AS NEEDED
Status: DISCONTINUED | OUTPATIENT
Start: 2023-09-13 | End: 2023-09-13 | Stop reason: SURG

## 2023-09-13 RX ORDER — PROMETHAZINE HYDROCHLORIDE 25 MG/1
25 SUPPOSITORY RECTAL ONCE AS NEEDED
Status: DISCONTINUED | OUTPATIENT
Start: 2023-09-13 | End: 2023-09-13 | Stop reason: HOSPADM

## 2023-09-13 RX ORDER — ACETAMINOPHEN 500 MG
1000 TABLET ORAL ONCE
Status: COMPLETED | OUTPATIENT
Start: 2023-09-13 | End: 2023-09-13

## 2023-09-13 RX ORDER — LEVOFLOXACIN 5 MG/ML
500 INJECTION, SOLUTION INTRAVENOUS ONCE
Status: COMPLETED | OUTPATIENT
Start: 2023-09-13 | End: 2023-09-13

## 2023-09-13 RX ORDER — SODIUM CHLORIDE 9 MG/ML
100 INJECTION, SOLUTION INTRAVENOUS CONTINUOUS
Status: DISCONTINUED | OUTPATIENT
Start: 2023-09-13 | End: 2023-09-13 | Stop reason: HOSPADM

## 2023-09-13 RX ORDER — ONDANSETRON 4 MG/1
4 TABLET, FILM COATED ORAL ONCE AS NEEDED
Status: DISCONTINUED | OUTPATIENT
Start: 2023-09-13 | End: 2023-09-13 | Stop reason: HOSPADM

## 2023-09-13 RX ORDER — SODIUM CHLORIDE 9 MG/ML
40 INJECTION, SOLUTION INTRAVENOUS AS NEEDED
Status: DISCONTINUED | OUTPATIENT
Start: 2023-09-13 | End: 2023-09-13 | Stop reason: HOSPADM

## 2023-09-13 RX ORDER — OXYCODONE HYDROCHLORIDE 5 MG/1
5 TABLET ORAL
Status: DISCONTINUED | OUTPATIENT
Start: 2023-09-13 | End: 2023-09-13 | Stop reason: HOSPADM

## 2023-09-13 RX ORDER — SODIUM CHLORIDE 0.9 % (FLUSH) 0.9 %
3 SYRINGE (ML) INJECTION EVERY 12 HOURS SCHEDULED
Status: DISCONTINUED | OUTPATIENT
Start: 2023-09-13 | End: 2023-09-13 | Stop reason: HOSPADM

## 2023-09-13 RX ORDER — PROMETHAZINE HYDROCHLORIDE 12.5 MG/1
25 TABLET ORAL ONCE AS NEEDED
Status: DISCONTINUED | OUTPATIENT
Start: 2023-09-13 | End: 2023-09-13 | Stop reason: HOSPADM

## 2023-09-13 RX ADMIN — DEXMEDETOMIDINE 10 MCG: 100 INJECTION, SOLUTION INTRAVENOUS at 14:39

## 2023-09-13 RX ADMIN — DEXMEDETOMIDINE 10 MCG: 100 INJECTION, SOLUTION INTRAVENOUS at 14:47

## 2023-09-13 RX ADMIN — PROPOFOL 250 MCG/KG/MIN: 10 INJECTION, EMULSION INTRAVENOUS at 14:39

## 2023-09-13 RX ADMIN — ACETAMINOPHEN 1000 MG: 500 TABLET ORAL at 13:14

## 2023-09-13 RX ADMIN — Medication 100 MCG: at 15:06

## 2023-09-13 RX ADMIN — LEVOFLOXACIN 500 MG: 5 INJECTION, SOLUTION INTRAVENOUS at 14:44

## 2023-09-13 RX ADMIN — MIDAZOLAM HYDROCHLORIDE 2 MG: 1 INJECTION, SOLUTION INTRAMUSCULAR; INTRAVENOUS at 14:16

## 2023-09-13 RX ADMIN — Medication 100 MCG: at 15:02

## 2023-09-13 RX ADMIN — SODIUM CHLORIDE, POTASSIUM CHLORIDE, SODIUM LACTATE AND CALCIUM CHLORIDE 9 ML/HR: 600; 310; 30; 20 INJECTION, SOLUTION INTRAVENOUS at 13:05

## 2023-09-13 RX ADMIN — LIDOCAINE HYDROCHLORIDE 40 MG: 20 INJECTION, SOLUTION EPIDURAL; INFILTRATION; INTRACAUDAL; PERINEURAL at 14:39

## 2023-09-13 RX ADMIN — PROPOFOL 50 MG: 10 INJECTION, EMULSION INTRAVENOUS at 14:47

## 2023-09-13 NOTE — ANESTHESIA POSTPROCEDURE EVALUATION
Patient: Kaiser Grier    Procedure Summary       Date: 09/13/23 Room / Location: [facility] OR 02 / [facility] MAIN OR    Anesthesia Start: 1434 Anesthesia Stop: 1509    Procedure: Cystoscopy and transrectal ultrasound volume study (Urethra) Diagnosis:       BPH without obstruction/lower urinary tract symptoms      (BPH without obstruction/lower urinary tract symptoms [N40.0])    Surgeons: Gianni Rosenberg MD Provider: Marcel Early MD    Anesthesia Type: general ASA Status: 3            Anesthesia Type: general    Vitals  Vitals Value Taken Time   BP 97/48 09/13/23 1517   Temp 36.3 °C (97.3 °F) 09/13/23 1505   Pulse 62 09/13/23 1519   Resp 20 09/13/23 1515   SpO2 99 % 09/13/23 1519   Vitals shown include unvalidated device data.        Post Anesthesia Care and Evaluation    Patient location during evaluation: bedside  Patient participation: complete - patient participated  Level of consciousness: awake  Pain score: 1  Pain management: adequate    Airway patency: patent  PONV Status: none  Cardiovascular status: acceptable and stable  Respiratory status: acceptable  Hydration status: acceptable    Comments: An Anesthesiologist personally participated in the most demanding procedures (including induction and emergence if applicable) in the anesthesia plan, monitored the course of anesthesia administration at frequent intervals and remained physically present and available for immediate diagnosis and treatment of emergencies.

## 2023-09-13 NOTE — OP NOTE
CYSTOSCOPY  Procedure Report    Patient Name:  Kaiser Grier  YOB: 1965    Date of Surgery:  9/13/2023      Pre-op Diagnosis:   BPH without obstruction/lower urinary tract symptoms [N40.0]       Postop diagnosis:    Same    Procedure/CPT® Codes:      Procedure(s):    Cystoscopy   transrectal ultrasound volume study    Staff:  Surgeon(s):  Gianni Rosenberg MD         Anesthesia: Monitored Anesthesia Care    Estimated Blood Loss: none    Implants:    Nothing was implanted during the procedure    Specimen:          None        Findings:     2.5 cm prostate  Normal bladder with moderate trabeculations throughout no pathology    Transrectal ultrasound volume study anterior/posterior 2.3, with 3.8, length 4.1 cm  Total volume 19.6 g    Complications: none    Description of Procedure:       After informed consent patient taken to the operating room.  Patient was laid supine and placed under general anesthesia by the anesthesia team.  At this point patient was placed in dorsal lithotomy position and prepped and draped in normal sterile fashion.  A multidisciplinary timeout was undertaken documenting the correct patient site and procedure.  At this point a 17 rigid cystoscope was placed into the urethra . Bladder was normal.   Bladder was examined.  See findings section       bladder was drained.        At this point I took a transrectal ultrasound probe and did volume study.  Probe gently placed into the rectum with loop.    Transrectal ultrasound volume study anterior/posterior 2.3, with 3.8, length 4.1 cm  Total volume 19.6 g    Prostate was measured in 3 dimensions.    No bleeding    Patient tolerated the procedure well, he was taken to the postanesthesia care unit without issue.              Gianni Rosenberg MD     Date: 9/13/2023  Time: 15:00 EDT

## 2023-09-13 NOTE — ANESTHESIA PREPROCEDURE EVALUATION
Anesthesia Evaluation     Patient summary reviewed and Nursing notes reviewed   no history of anesthetic complications:   NPO Solid Status: > 8 hours  NPO Liquid Status: > 2 hours           Airway   Mallampati: III  TM distance: >3 FB  Neck ROM: full  No difficulty expected  Dental    (+) partials    Pulmonary - normal exam    breath sounds clear to auscultation  (+) ,sleep apnea  Cardiovascular - normal exam  Exercise tolerance: good (4-7 METS)    Rhythm: regular  Rate: normal    (+) hypertension      Neuro/Psych  (+) numbness, psychiatric history  GI/Hepatic/Renal/Endo    (+) GERD    Musculoskeletal     (+) neck pain  Abdominal    Substance History - negative use     OB/GYN negative ob/gyn ROS         Other   arthritis,     ROS/Med Hx Other: PAT Nursing Notes unavailable.                 Anesthesia Plan    ASA 3     general     (Patient understands anesthesia not responsible for dental damage.)  intravenous induction     Anesthetic plan, risks, benefits, and alternatives have been provided, discussed and informed consent has been obtained with: patient.    Use of blood products discussed with patient .    Plan discussed with CRNA.    CODE STATUS:

## 2023-09-13 NOTE — H&P
[facility]   UROLOGY HISTORY AND PHYSICAL    Patient Name: Kaiser Grier  : 1965  MRN: [id number]  Primary Care Physician:  Nery Herr APRN  Date of admission: 2023    Subjective   Subjective     Chief Complaint:    BPH    HPI:    Kaiser Grier is a 58 y.o. male     BPH    No change in H&P    Review of Systems     10 systems reviewed and are negative other than what is listed in HPI    Personal History     Past Medical History:   Diagnosis Date    Aftercare following joint replacement surgery 2020    Anxiety     Body mass index (BMI) of 22.0-22.9 in adult 2022    Last Assessment & Plan:   Formatting of this note might be different from the original.  Condition: stable     Educated patient on normal BMI range of 18.5 to 24.9     Advised to monitor nutrition to not exceed caloric needs, or as indicated by PCP in order to maintain a healthy weight and BMI.     Advised to engage in aerobic physical activity, if indicated to be safe by PCP, to assist with maint    BPH with obstruction/lower urinary tract symptoms 2019    Cannabis abuse, uncomplicated 2022    Formatting of this note might be different from the original.  Member states that he uses marijuana for back pain. MD has stoppled his lortabs for now     Last Assessment & Plan:   Formatting of this note might be different from the original.  Condition: stable     Follow up in: one week    Cervical disc herniation 02/15/2017    Chronic low back pain 2021    Encounter for removal of sutures 2020    Erectile dysfunction 2-2-    False positive cardiac stress test     , essentially normal coronaries    GERD (gastroesophageal reflux disease) 2021    Hypertension     Major depressive disorder, single episode, unspecified 2012    MARIA DOLORES on CPAP 2019    Painful urination     Routine health maintenance 2022    Last Assessment & Plan:   Formatting of this note might be different from  the original.  Condition: stable     Follow up in: three months       Past Surgical History:   Procedure Laterality Date    COLONOSCOPY      ENDOSCOPY      JOINT REPLACEMENT Left     KNEE    SPINE SURGERY         Family History: family history includes Alzheimer's disease in his maternal grandmother and mother; Dementia in his maternal grandmother, mother, and sister; Diabetes in his brother and sister; Heart disease in his father and mother; Hypertension in his father and mother; Prostate cancer in his paternal grandfather. Otherwise pertinent FHx was reviewed and not pertinent to current issue.    Social History:  reports that he quit smoking about 18 years ago. His smoking use included cigarettes. He started smoking about 42 years ago. He has a 0.32 pack-year smoking history. He has never used smokeless tobacco. He reports that he does not currently use alcohol after a past usage of about 2.0 standard drinks per week. He reports current drug use. Drugs: Cocaine(coke) and Marijuana.    Home Medications:  Insulin Syringe-Needle U-100, Syringe, Venlafaxine HCl, cyanocobalamin, folic acid, gabapentin, meloxicam, pantoprazole, promethazine, tadalafil, and tamsulosin      Allergies:  Allergies   Allergen Reactions    Fish-Derived Products Anaphylaxis    Shellfish-Derived Products Anaphylaxis    Trazodone Unknown - Low Severity     Jittery       Objective   Objective     Vitals:   Temp:  [98.2 °F (36.8 °C)] 98.2 °F (36.8 °C)  Heart Rate:  [52] 52  Resp:  [18] 18  BP: (127)/(61) 127/61  Physical Exam    Constitutional: Awake, alert    Respiratory: Clear to auscultation bilaterally, nonlabored respirations    Cardiovascular: RRR, no murmurs, rubs, or gallops, palpable pedal pulses bilaterally   Gastrointestinal: Positive bowel sounds, soft, nontender, nondistended   Musculoskeletal: No bilateral ankle edema, no clubbing or cyanosis to extremities     Result Review    Result Review:  I have personally reviewed the results  from the time of this admission to 9/13/2023 12:42 EDT and agree with these findings:  []  Laboratory  []  Microbiology  []  Radiology  []  EKG/Telemetry   []  Cardiology/Vascular   []  Pathology  []  Old records  []  Other:    Assessment & Plan   Assessment / Plan     Brief Patient Summary:  Kaiser Grier is a 58 y.o. male     Active Hospital Problems:  Active Hospital Problems    Diagnosis     **Benign prostatic hyperplasia with lower urinary tract symptoms     Benign prostatic hyperplasia (BPH) with urinary urgency        Plan:     Cystoscopy, transrectal ultrasound volume study.  Risks and benefits were discussed including bleeding, infection and damage to the urinary system.  We also discussed the risk of anesthesia up to and including death.  Patient voiced understanding and would like to proceed.      Electronically signed by Gianni Rosenberg MD, 09/13/23, 12:42 PM EDT.

## 2023-09-13 NOTE — DISCHARGE INSTRUCTIONS
DISCHARGE INSTRUCTIONS CYSTOSCOPY      For your surgery you had:  Local anesthesia  Monitored anesthesia care  You may experience dizziness, drowsiness, or lightheadedness for several hours following surgery.  Do not stay alone today or tonight.  Limit your activity for 24 hours.  You should not drive, operate machinery, drink alcohol, or sign legally binding documents for 24 hours or while you are taking pain medication.  Resume your diet slowly.  Follow any special dietary instructions you may have been given by your doctor.    NOTIFY YOUR DOCTOR IF YOU EXPERIENCE ANY OF THE FOLLOWING:  Temperature greater than 101 degrees Fahrenheit  Shaking Chills  Redness or excessive drainage from incision  Nausea, vomiting and/or pain that is not controlled by prescribed medications  Increase in bleeding or bleeding that is excessive  Unable to urinate in 6 hours after surgery  If unable to reach your doctor, please go to the closest Emergency Room   Following your cystoscopy exam, you may experience burning upon urination.  You may also pass some bloody urine.  If the burning sensation and/or bloody urine should persist beyond 48 hours, call your doctor.  You may have bloody drainage from the rectum area for 3-4 days.  To encourage kidney and bladder function, you should drink as much fluid as possible.  If you have difficulty urinating, try sitting in a bath tub of warm water.  If you become uncomfortable because you cannot urinate, call your doctor or come to the Emergency Room at the hospital.        SPECIAL INSTRUCTIONS:      Last dose of pain medication given at:

## 2023-09-14 ENCOUNTER — OFFICE VISIT (OUTPATIENT)
Dept: INTERNAL MEDICINE | Facility: CLINIC | Age: 58
End: 2023-09-14
Payer: MEDICARE

## 2023-09-14 VITALS
OXYGEN SATURATION: 93 % | TEMPERATURE: 97.9 F | DIASTOLIC BLOOD PRESSURE: 71 MMHG | WEIGHT: 141.4 LBS | HEIGHT: 66 IN | SYSTOLIC BLOOD PRESSURE: 125 MMHG | HEART RATE: 54 BPM | BODY MASS INDEX: 22.73 KG/M2

## 2023-09-14 DIAGNOSIS — N13.8 BPH WITH OBSTRUCTION/LOWER URINARY TRACT SYMPTOMS: ICD-10-CM

## 2023-09-14 DIAGNOSIS — R11.2 NAUSEA AND VOMITING, UNSPECIFIED VOMITING TYPE: ICD-10-CM

## 2023-09-14 DIAGNOSIS — N40.1 BENIGN PROSTATIC HYPERPLASIA (BPH) WITH URINARY URGENCY: ICD-10-CM

## 2023-09-14 DIAGNOSIS — M54.2 CHRONIC NECK PAIN: ICD-10-CM

## 2023-09-14 DIAGNOSIS — R10.13 EPIGASTRIC PAIN: ICD-10-CM

## 2023-09-14 DIAGNOSIS — F41.9 ANXIETY: ICD-10-CM

## 2023-09-14 DIAGNOSIS — R39.15 BENIGN PROSTATIC HYPERPLASIA (BPH) WITH URINARY URGENCY: ICD-10-CM

## 2023-09-14 DIAGNOSIS — M50.20 CERVICAL DISC HERNIATION: ICD-10-CM

## 2023-09-14 DIAGNOSIS — N52.8 OTHER MALE ERECTILE DYSFUNCTION: ICD-10-CM

## 2023-09-14 DIAGNOSIS — G89.29 CHRONIC NECK PAIN: ICD-10-CM

## 2023-09-14 DIAGNOSIS — N40.1 BPH WITH OBSTRUCTION/LOWER URINARY TRACT SYMPTOMS: ICD-10-CM

## 2023-09-14 DIAGNOSIS — K21.9 GASTROESOPHAGEAL REFLUX DISEASE, UNSPECIFIED WHETHER ESOPHAGITIS PRESENT: ICD-10-CM

## 2023-09-14 DIAGNOSIS — G89.29 CHRONIC BILATERAL LOW BACK PAIN WITH BILATERAL SCIATICA: Primary | ICD-10-CM

## 2023-09-14 DIAGNOSIS — E78.5 DYSLIPIDEMIA: ICD-10-CM

## 2023-09-14 DIAGNOSIS — R41.3 MEMORY LOSS: ICD-10-CM

## 2023-09-14 DIAGNOSIS — M43.13 SPONDYLOLISTHESIS OF CERVICOTHORACIC REGION: ICD-10-CM

## 2023-09-14 DIAGNOSIS — M54.41 CHRONIC BILATERAL LOW BACK PAIN WITH BILATERAL SCIATICA: Primary | ICD-10-CM

## 2023-09-14 DIAGNOSIS — G62.9 POLYNEUROPATHY, UNSPECIFIED: ICD-10-CM

## 2023-09-14 DIAGNOSIS — Z98.1 S/P CERVICAL SPINAL FUSION: ICD-10-CM

## 2023-09-14 DIAGNOSIS — M54.42 CHRONIC BILATERAL LOW BACK PAIN WITH BILATERAL SCIATICA: Primary | ICD-10-CM

## 2023-09-14 RX ORDER — TADALAFIL 5 MG/1
5 TABLET ORAL DAILY PRN
Qty: 90 TABLET | Refills: 3 | Status: SHIPPED | OUTPATIENT
Start: 2023-09-14

## 2023-09-14 RX ORDER — SUCRALFATE 1 G/1
1 TABLET ORAL 3 TIMES DAILY
Qty: 90 TABLET | Refills: 4 | Status: SHIPPED | OUTPATIENT
Start: 2023-09-14

## 2023-09-14 RX ORDER — BUSPIRONE HYDROCHLORIDE 7.5 MG/1
7.5 TABLET ORAL 2 TIMES DAILY
Qty: 60 TABLET | Refills: 3 | Status: SHIPPED | OUTPATIENT
Start: 2023-09-14

## 2023-09-14 NOTE — PROGRESS NOTES
Chief Complaint  GI Problem, Memory Loss, Anxiety, and Depression     Subjective:      History of Present Illness {CC  Problem List  Visit  Diagnosis   Encounters  Notes  Medications  Labs  Result Review Imaging  Media :23}     [facility] presents to [facility] for:      History of Present Illness   Low back pain with sciatica: pain management               -Patient was seeing old pain management practice, but states he recently went to another practice, but will not be seen until September.  He is okay with being referred to a pain management clinic out this way as he feels [employer] [address] [employer] is too far for him to drive.   - Pt was referred to [facility] but declined appt as he states that he did not like the conversation they had.      HLD and HTN: pt has changed his eating habits and states he eats relatively well.      Depression:  effexor              - PT states he is taking this. Pt has no complaints.        BPH- Urology- Dr. Martinez (goes today)                - flomax, cialis- pt does not have cialis as it will  not be covered by his insurance.               - managed by Urology.  -Patients till has elevated testosterone- but states his urologist has not brought this up.   -MD Chet did cystoscopy 9/13/2023  - pt does not know result    PT states he has had memory issues that has declined since hte last 3-4 months. Pt states he has family hx of dementia. Pt states this is affecting his jobs. Pt states he is very stressed.     Patient states he has been vomiting intermittently at work which affects his appetite.  Patient states this because his stomach is upset.  Patient has had many scopes with no acute illnesses or results.  Patient states he is taking Protonix daily.       [facility] is here for coordination of medical care, to discuss health maintenance, disease prevention as well as to follow up on medical problems.     Patient Care  "Team:  Nery Herr APRN as PCP - General (Nurse Practitioner)      He states that his activity level is rarely.   His diet is in general, an \"unhealthy\" diet.   Feels fairly well with many complaints.     Health and Weight:   Weight trend is   Wt Readings from Last 4 Encounters:   09/14/23 64.1 kg (141 lb 6.4 oz)   09/13/23 62.5 kg (137 lb 12.6 oz)   08/22/23 64.4 kg (142 lb)   07/13/23 65.3 kg (144 lb)         Mental Health Check:   Depression screen: PHQ-2 Total Score: 0     GAD7 Score: 20       Blood Pressure:   BP Readings from Last 3 Encounters:   09/14/23 125/71   09/13/23 114/55   07/13/23 118/59        Cholesterol Screen:   Most men start screen at 35, if you have family history or comorbidities it may be screen earlier.     Risk Evaluation:  1. Cardiovascular risk factors: hypertension, hyperlipidemia, family history of CAD, male gender, age.  2. Diabetes risk factors: FH of diabetes.   3. Cancer risk factors: FH of prostate cancer.    Prevention:   Cholesterol and glucose screen due.   Hepatitis  C screen: [x] Due  [] Completed :     Colon Cancer Screen:   He has no change in bowel habits.   Patient's last colonoscopy was 7/2020.   He is advised to repeat in 10 years.  Family history: [x] None    [] Yes:     Genital/ Urinary Health:   He voids without difficulty.  He is not sexually active.    He does have erectile problems.    STI Screen:   [] HIV     [] Syphilis   [] Chlamydia/ Gonorrhea   [x] Declines STD screen  If tests ordered, patient was informed HIV results will not show up on my chart.     Testicular cancer Screen:   Testicular self exams recommended once a month.   Advised that any firm testicular nodules to be reported immediately.    Prostate cancer screening:  PSA was reviewed He has increased risk of prostate cancer.   Family history: [] None    [x] Yes:     Lung Cancer Screen:   [x] Nonsmoker  [] History of smoking  []     Abdominal Aortic Aneurysm:   Age between 65 - 75 years " "of age that has ever smoked.   [x] N/A       [] Advised     Vaccines Due:   [] Pneumovax    [] Prevnar 20  [x] Shingrix (shingles: series of 2)   []  [x] COVID (series of 2)    []      Last eye exam:  UTD  Always where sunglass when outside with UV protection.     He does see his dentist regularly.    Skin Cancer:   Regular Sunsceen: Advised  Routine self assessment of your skin, report any changes.      I have reviewed patient's medical history, any new submitted information provided by patient or medical assistant and updated medical record.      Objective:      Physical Exam  Vitals reviewed.   Constitutional:       General: He is not in acute distress.     Appearance: Normal appearance. He is not ill-appearing, toxic-appearing or diaphoretic.   HENT:      Head: Normocephalic.   Cardiovascular:      Rate and Rhythm: Normal rate and regular rhythm.      Pulses: Normal pulses.      Heart sounds: Normal heart sounds.   Pulmonary:      Effort: Pulmonary effort is normal.      Breath sounds: Normal breath sounds.   Musculoskeletal:      Right lower leg: No edema.      Left lower leg: No edema.   Skin:     General: Skin is warm and dry.      Capillary Refill: Capillary refill takes less than 2 seconds.   Neurological:      General: No focal deficit present.      Mental Status: He is alert.   Psychiatric:         Mood and Affect: Mood is anxious.         Behavior: Behavior normal.      Result Review  Data Reviewed:{ Labs  Result Review  Imaging  Med Tab  Media :23}                Vital Signs:   /71 (BP Location: Left arm, Patient Position: Sitting, Cuff Size: Adult)   Pulse 54   Temp 97.9 °F (36.6 °C) (Oral)   Ht 167.6 cm (66\")   Wt 64.1 kg (141 lb 6.4 oz)   SpO2 93%   BMI 22.82 kg/m²         Requested Prescriptions     Signed Prescriptions Disp Refills    busPIRone (BUSPAR) 7.5 MG tablet 60 tablet 3     Sig: Take 1 tablet by mouth 2 (Two) Times a Day.    sucralfate (Carafate) 1 g tablet 90 tablet 4     " "Sig: Take 1 tablet by mouth 3 (Three) Times a Day.    tadalafil (Cialis) 5 MG tablet 90 tablet 3     Sig: Take 1 tablet by mouth Daily As Needed for Erectile Dysfunction. Indications: Benign Enlargement of Prostate, Erectile Dysfunction       Routine medications provided by this office will also be refilled via pharmacy request.       Current Outpatient Medications:     B-D INSULIN SYRINGE 1CC/25GX1\" 25G X 1\" 1 ML misc, USE WITH B12 INJECTIONS, Disp: , Rfl:     cyanocobalamin 1000 MCG/ML injection, Inject 1 mL into the appropriate muscle as directed by prescriber Every 30 (Thirty) Days., Disp: 1 mL, Rfl: 8    folic acid (FOLVITE) 1 MG tablet, Take 1 tablet by mouth Daily., Disp: 90 tablet, Rfl: 1    gabapentin (Neurontin) 600 MG tablet, Take 1 tablet by mouth 3 (Three) Times a Day., Disp: 270 tablet, Rfl: 1    meloxicam (MOBIC) 15 MG tablet, Take 1 tablet by mouth Daily As Needed (pain)., Disp: 90 tablet, Rfl: 3    pantoprazole (PROTONIX) 40 MG EC tablet, Take 1 tablet by mouth Daily., Disp: 90 tablet, Rfl: 3    promethazine (PHENERGAN) 12.5 MG tablet, Take 1 tablet by mouth Every 8 (Eight) Hours As Needed for Nausea. Uses sparingly, Disp: 24 tablet, Rfl: 2    Syringe 25G X 1\" 3 ML misc, To use with b12 injections, Disp: 25 each, Rfl: 0    tadalafil (Cialis) 5 MG tablet, Take 1 tablet by mouth Daily As Needed for Erectile Dysfunction. Indications: Benign Enlargement of Prostate, Erectile Dysfunction, Disp: 90 tablet, Rfl: 3    tamsulosin (FLOMAX) 0.4 MG capsule 24 hr capsule, Take 1 capsule by mouth Daily., Disp: 30 capsule, Rfl: 5    Venlafaxine HCl (EFFEXOR PO), Take  by mouth., Disp: , Rfl:     busPIRone (BUSPAR) 7.5 MG tablet, Take 1 tablet by mouth 2 (Two) Times a Day., Disp: 60 tablet, Rfl: 3    sucralfate (Carafate) 1 g tablet, Take 1 tablet by mouth 3 (Three) Times a Day., Disp: 90 tablet, Rfl: 4    Current Facility-Administered Medications:     cyanocobalamin injection 1,000 mcg, 1,000 mcg, Intramuscular, " Q28 Days, Keaton Bach DO, 1,000 mcg at 06/02/23 1622    nitroglycerin (NITROSTAT) SL tablet 0.4 mg, 0.4 mg, Sublingual, Q5 Min PRN, Kahlil Mcmanus MD    sodium chloride 0.9 % flush 10 mL, 10 mL, Intravenous, PRN, Kahlil Mcmanus MD     Assessment and Plan:      Assessment and Plan {CC Problem List  Visit Diagnosis  ROS  Review (Popup)  Health Maintenance  Quality  BestPractice  Medications  SmartSets  SnapShot Encounters  Media :23}     Problem List Items Addressed This Visit          Cardiac and Vasculature    Dyslipidemia       Gastrointestinal Abdominal     Epigastric pain    GERD (gastroesophageal reflux disease)    Relevant Medications    sucralfate (Carafate) 1 g tablet    Nausea and vomiting    Overview     Last Assessment & Plan:   Formatting of this note might be different from the original.  Condition: stable    Follow up in: three months            Genitourinary and Reproductive     Benign prostatic hyperplasia (BPH) with urinary urgency    Relevant Medications    tadalafil (Cialis) 5 MG tablet       Mental Health    Anxiety    Relevant Medications    busPIRone (BUSPAR) 7.5 MG tablet       Musculoskeletal and Injuries    Chronic low back pain - Primary    Relevant Orders    Ambulatory Referral to Pain Management (Completed)    Chronic neck pain    Relevant Orders    Ambulatory Referral to Pain Management (Completed)    Spondylolisthesis of cervicothoracic region    Relevant Orders    Ambulatory Referral to Pain Management (Completed)       Neuro    Cervical disc herniation    Relevant Orders    Ambulatory Referral to Pain Management (Completed)    S/P cervical spinal fusion    Overview     Formatting of this note might be different from the original.  ACDF C4-C7, Feb 2017         Relevant Orders    Ambulatory Referral to Pain Management (Completed)    Polyneuropathy, unspecified    Relevant Orders    Ambulatory Referral to Pain Management (Completed)     Other Visit Diagnoses       BPH  with obstruction/lower urinary tract symptoms        Relevant Medications    tadalafil (Cialis) 5 MG tablet    Other male erectile dysfunction        Relevant Medications    tadalafil (Cialis) 5 MG tablet          Educated patient on dosing and side effects of BuSpar and Carafate.  We will try to refill Cialis as this has been the only thing that is worked for him in the past.  We will refer him to another pain management clinic of his choice.  I think at this point his memory loss is due to anxiety and stress.  We will try the BuSpar and reassess his memory loss if this is successful.  Educated patient to maintain a healthy diet and exercise.  We will see him again in 3 months and we will review labs and do Medicare wellness at this time.  Patient educated to talk to urologist regarding elevated testosterone to determine if this is something urology would like to follow-up on.  Educated patient that neurosurgery referral has been placed and they will call him soon.  Patient verbalized understanding and is comfortable with the plan of care.    Follow Up {Instructions Charge Capture  Follow-up Communications :23}     Return in about 3 months (around 12/14/2023) for Medicare Wellness, Recheck.    I spent 40 minutes caring for [facility] on this date of service. This time includes time spent by me in the following activities: preparing for the visit, reviewing tests, obtaining and/or reviewing a separately obtained history, performing a medically appropriate examination and/or evaluation, counseling and educating the patient/family/caregiver, ordering medications, tests, or procedures, referring and communicating with other health care professionals, documenting information in the medical record, independently interpreting results and communicating that information with the patient/family/caregiver, and care coordination   Patient was given instructions and counseling regarding his condition or for health maintenance  advice. Please see specific information pulled into the AVS if appropriate.    Bailey disclaimer:   Much of this encounter note is an electronic transcription/translation of spoken language to printed text. The electronic translation of spoken language may permit erroneous, or at times, nonsensical words or phrases to be inadvertently transcribed; Although I have reviewed the note for such errors, some may still exist.     Additional Patient Counseling:       There are no Patient Instructions on file for this visit.

## 2023-10-01 NOTE — PROGRESS NOTES
Chief Complaint: Urologic complaint    Subjective         History of Present Illness  [facility] Grier is a 58 y.o. male           BPH with LUTS  ED      Currently on Flomax 0.4 mg daily.  Strains to void if he has to wait.  Does not strain normally.    On tadalafil 5 mg daily.  Not  sure if this is helping.    Nocturia x3, intermittency.  Sensation incomplete emptying.        9/13/2023 cystoscopy/TRUS -2.5 cm prostate.  Normal bladder with moderate trabeculation.  19.6 g prostate  8/23   Q-Gino   14.3    9/23 urine culture-negative      Previous    6/23 doxycycline x3  for possible prostatitis -did not change symptoms    Trimix - did not help.    QOL  6    Some trouble with ED. Worried about htis    Will not do office cystoscopy or TRUS    No GH     No history of kidney  stone.    No urologic family history   no history of urologic surgery.    No cardiopulmonary history   Non-smoker.marijuna  No anticoagulation      5/23    UA-negative, 1.1, GFR 77        PSA    5/23    0.6  5/20    0.39    IPSS - 26  QOL - 5      Objective     Past Medical History:   Diagnosis Date    Aftercare following joint replacement surgery 06/17/2020    Anxiety     Body mass index (BMI) of 22.0-22.9 in adult 05/16/2022    Last Assessment & Plan:   Formatting of this note might be different from the original.  Condition: stable     Educated patient on normal BMI range of 18.5 to 24.9     Advised to monitor nutrition to not exceed caloric needs, or as indicated by PCP in order to maintain a healthy weight and BMI.     Advised to engage in aerobic physical activity, if indicated to be safe by PCP, to assist with maint    BPH with obstruction/lower urinary tract symptoms 08/07/2019    Cannabis abuse, uncomplicated 05/16/2022    Formatting of this note might be different from the original.  Member states that he uses marijuana for back pain. MD has stoppled his lortabs for now     Last Assessment & Plan:   Formatting of this note might be different  "from the original.  Condition: stable     Follow up in: one week    Cervical disc herniation 02/15/2017    Chronic low back pain 05/11/2021    Encounter for removal of sutures 06/19/2020    Erectile dysfunction 2-2-2020    False positive cardiac stress test     2018, essentially normal coronaries    GERD (gastroesophageal reflux disease) 05/11/2021    Hypertension     Major depressive disorder, single episode, unspecified 09/29/2012    MARIA DOLORES on CPAP 11/19/2019    Painful urination     Routine health maintenance 05/16/2022    Last Assessment & Plan:   Formatting of this note might be different from the original.  Condition: stable     Follow up in: three months       Past Surgical History:   Procedure Laterality Date    COLONOSCOPY      CYSTOSCOPY N/A 9/13/2023    Procedure: Cystoscopy and transrectal ultrasound volume study;  Surgeon: Gianni Rosenberg MD;  Location: [facility] OR;  Service: Urology;  Laterality: N/A;    ENDOSCOPY      JOINT REPLACEMENT Left     KNEE    SPINE SURGERY           Current Outpatient Medications:     B-D INSULIN SYRINGE 1CC/25GX1\" 25G X 1\" 1 ML misc, USE WITH B12 INJECTIONS, Disp: , Rfl:     busPIRone (BUSPAR) 7.5 MG tablet, Take 1 tablet by mouth 2 (Two) Times a Day., Disp: 60 tablet, Rfl: 3    cyanocobalamin 1000 MCG/ML injection, Inject 1 mL into the appropriate muscle as directed by prescriber Every 30 (Thirty) Days., Disp: 1 mL, Rfl: 8    folic acid (FOLVITE) 1 MG tablet, Take 1 tablet by mouth Daily., Disp: 90 tablet, Rfl: 1    gabapentin (Neurontin) 600 MG tablet, Take 1 tablet by mouth 3 (Three) Times a Day., Disp: 270 tablet, Rfl: 1    meloxicam (MOBIC) 15 MG tablet, Take 1 tablet by mouth Daily As Needed (pain)., Disp: 90 tablet, Rfl: 3    pantoprazole (PROTONIX) 40 MG EC tablet, Take 1 tablet by mouth Daily., Disp: 90 tablet, Rfl: 3    promethazine (PHENERGAN) 12.5 MG tablet, Take 1 tablet by mouth Every 8 (Eight) Hours As Needed for Nausea. Uses sparingly, Disp: 24 tablet, " "Rfl: 2    sucralfate (Carafate) 1 g tablet, Take 1 tablet by mouth 3 (Three) Times a Day., Disp: 90 tablet, Rfl: 4    Syringe 25G X 1\" 3 ML misc, To use with b12 injections, Disp: 25 each, Rfl: 0    tadalafil (Cialis) 5 MG tablet, Take 1 tablet by mouth Daily As Needed for Erectile Dysfunction. Indications: Benign Enlargement of Prostate, Erectile Dysfunction, Disp: 90 tablet, Rfl: 3    tamsulosin (FLOMAX) 0.4 MG capsule 24 hr capsule, Take 1 capsule by mouth Daily., Disp: 30 capsule, Rfl: 5    Venlafaxine HCl (EFFEXOR PO), Take  by mouth., Disp: , Rfl:     Current Facility-Administered Medications:     cyanocobalamin injection 1,000 mcg, 1,000 mcg, Intramuscular, Q28 Days, Keaton Bach DO, 1,000 mcg at 23 1622    nitroglycerin (NITROSTAT) SL tablet 0.4 mg, 0.4 mg, Sublingual, Q5 Min PRN, Kahlil Mcmanus MD    sodium chloride 0.9 % flush 10 mL, 10 mL, Intravenous, PRN, Kahlil Mcmanus MD    Allergies   Allergen Reactions    Fish-Derived Products Anaphylaxis    Shellfish-Derived Products Anaphylaxis    Trazodone Unknown - Low Severity     Jittery        Family History   Problem Relation Age of Onset    Heart disease Mother     Hypertension Mother     Dementia Mother     Alzheimer's disease Mother     Heart disease Father     Hypertension Father     Diabetes Sister     Diabetes Brother     Dementia Maternal Grandmother     Alzheimer's disease Maternal Grandmother     Prostate cancer Paternal Grandfather     Dementia Sister        Social History     Socioeconomic History    Marital status:    Tobacco Use    Smoking status: Former     Packs/day: 0.08     Years: 4.00     Pack years: 0.32     Types: Cigarettes     Start date: 1981     Quit date: 6/10/2005     Years since quittin.3    Smokeless tobacco: Never   Vaping Use    Vaping Use: Never used   Substance and Sexual Activity    Alcohol use: Not Currently     Alcohol/week: 2.0 standard drinks     Types: 2 Cans of beer per week    Drug use: Yes "     Types: Cocaine(coke), Marijuana     Comment: currently only marijuana .. hx of cocaine nothing current    Sexual activity: Yes     Partners: Female     Birth control/protection: None       Vital Signs:   There were no vitals taken for this visit.         Results for orders placed or performed in visit on 09/07/23   Urine Culture - Urine, Urine, Clean Catch    Specimen: Urine, Clean Catch   Result Value Ref Range    Urine Culture No growth              Assessment and Plan    Diagnoses and all orders for this visit:    1. Benign prostatic hyperplasia with lower urinary tract symptoms, symptom details unspecified (Primary)           Secondary to severe anxiety patient cannot do procedures at the office      Discussed with patient today his prostate is too small for Aquablation.  He is not interested in TURP as he is worried about the risk of erectile dysfunction retrograde ejaculation    I do think he would be a candidate for Rezum or UroLift.    At this time I do not do UroLift and only do Rezum in the office.  [facility] to do either Rezum or UroLift under sedation.    Patient voiced understanding

## 2023-10-03 ENCOUNTER — OFFICE VISIT (OUTPATIENT)
Dept: UROLOGY | Facility: CLINIC | Age: 58
End: 2023-10-03
Payer: MEDICARE

## 2023-10-03 DIAGNOSIS — N40.1 BENIGN PROSTATIC HYPERPLASIA WITH LOWER URINARY TRACT SYMPTOMS, SYMPTOM DETAILS UNSPECIFIED: Primary | ICD-10-CM

## 2023-10-03 PROCEDURE — 99213 OFFICE O/P EST LOW 20 MIN: CPT | Performed by: UROLOGY

## 2023-10-03 PROCEDURE — 1159F MED LIST DOCD IN RCRD: CPT | Performed by: UROLOGY

## 2023-10-03 PROCEDURE — 1160F RVW MEDS BY RX/DR IN RCRD: CPT | Performed by: UROLOGY

## 2023-12-11 DIAGNOSIS — R11.0 NAUSEA: ICD-10-CM

## 2023-12-11 DIAGNOSIS — R10.13 EPIGASTRIC ABDOMINAL PAIN: ICD-10-CM

## 2023-12-11 RX ORDER — PANTOPRAZOLE SODIUM 40 MG/1
40 TABLET, DELAYED RELEASE ORAL DAILY
Qty: 90 TABLET | Refills: 3 | OUTPATIENT
Start: 2023-12-11

## 2023-12-11 RX ORDER — MELOXICAM 15 MG/1
15 TABLET ORAL DAILY PRN
Qty: 90 TABLET | Refills: 3 | OUTPATIENT
Start: 2023-12-11

## 2024-01-09 ENCOUNTER — OFFICE VISIT (OUTPATIENT)
Dept: INTERNAL MEDICINE | Facility: CLINIC | Age: 59
End: 2024-01-09
Payer: MEDICARE

## 2024-01-09 VITALS
HEIGHT: 66 IN | SYSTOLIC BLOOD PRESSURE: 122 MMHG | OXYGEN SATURATION: 97 % | DIASTOLIC BLOOD PRESSURE: 60 MMHG | BODY MASS INDEX: 23.78 KG/M2 | RESPIRATION RATE: 18 BRPM | WEIGHT: 148 LBS | HEART RATE: 56 BPM

## 2024-01-09 DIAGNOSIS — M54.2 CHRONIC NECK PAIN: Primary | Chronic | ICD-10-CM

## 2024-01-09 DIAGNOSIS — G89.29 CHRONIC MIDLINE LOW BACK PAIN, UNSPECIFIED WHETHER SCIATICA PRESENT: Chronic | ICD-10-CM

## 2024-01-09 DIAGNOSIS — G89.29 CHRONIC NECK PAIN: Primary | Chronic | ICD-10-CM

## 2024-01-09 DIAGNOSIS — Z23 NEED FOR INFLUENZA VACCINATION: ICD-10-CM

## 2024-01-09 DIAGNOSIS — M54.50 CHRONIC MIDLINE LOW BACK PAIN, UNSPECIFIED WHETHER SCIATICA PRESENT: Chronic | ICD-10-CM

## 2024-01-09 PROCEDURE — 1159F MED LIST DOCD IN RCRD: CPT | Performed by: NURSE PRACTITIONER

## 2024-01-09 PROCEDURE — G0008 ADMIN INFLUENZA VIRUS VAC: HCPCS | Performed by: NURSE PRACTITIONER

## 2024-01-09 PROCEDURE — 90686 IIV4 VACC NO PRSV 0.5 ML IM: CPT | Performed by: NURSE PRACTITIONER

## 2024-01-09 PROCEDURE — 3078F DIAST BP <80 MM HG: CPT | Performed by: NURSE PRACTITIONER

## 2024-01-09 PROCEDURE — 1160F RVW MEDS BY RX/DR IN RCRD: CPT | Performed by: NURSE PRACTITIONER

## 2024-01-09 PROCEDURE — 99213 OFFICE O/P EST LOW 20 MIN: CPT | Performed by: NURSE PRACTITIONER

## 2024-01-09 PROCEDURE — 3074F SYST BP LT 130 MM HG: CPT | Performed by: NURSE PRACTITIONER

## 2024-01-09 NOTE — PROGRESS NOTES
Chief Complaint  Back Pain and Neck Pain     Subjective:      History of Present Illness {CC  Problem List  Visit  Diagnosis   Encounters  Notes  Medications  Labs  Result Review Imaging  Media :23}     Kaiser Grier is a patient of Nery Herr APRN and presents to [facility] for     Chronic neck/back pain:   He was referred to pain management by PCP.     Doesn't know who it is - states can't get in until later this month from referral placed in September.     States NP here can't do what he needs.   States if he were in [place] where his family works would stop him from hurting.     Neck surgery  He has been seen by [facility]: 10/19/23  Per notes: pain started 2022 neck and back - prior fusion:  S/p POSTERIOR SPINE DECOMPRESSION AND FUSION WITH INSTRUMENTATION C2-T2, INFUSE BILATERAL C6-C7 AND T1-T2 FORAMINOTOMIES by Dr. Hoang and Dr. Hoang in July 2019     Plan was for MRI.   He states he has not heard from their office, nor has he contacted them.    No change in B/B or weakness in lower extremities.      He asks for [employer] paper work to be completed today as he works for a [employer] affiliate.     Needs flu vaccine today.       I have reviewed patient's medical history, any new submitted information provided by patient or medical assistant and updated medical record.      Objective:      Physical Exam  Cardiovascular:      Rate and Rhythm: Normal rate and regular rhythm.      Pulses: Normal pulses.      Heart sounds: Normal heart sounds.   Pulmonary:      Effort: Pulmonary effort is normal.      Breath sounds: Normal breath sounds.   Musculoskeletal:         General: Normal range of motion.   Neurological:      Mental Status: He is oriented to person, place, and time.        Result Review  Data Reviewed:{ Labs  Result Review  Imaging  Med Tab  Media :23}                Vital Signs:   /60 (BP Location: Right arm, Patient Position:  "Sitting, Cuff Size: Small Adult)   Pulse 56   Resp 18   Ht 167.6 cm (66\")   Wt 67.1 kg (148 lb)   SpO2 97%   BMI 23.89 kg/m²         Requested Prescriptions      No prescriptions requested or ordered in this encounter       Routine medications provided by this office will also be refilled via pharmacy request.       Current Outpatient Medications:     B-D INSULIN SYRINGE 1CC/25GX1\" 25G X 1\" 1 ML misc, USE WITH B12 INJECTIONS, Disp: , Rfl:     busPIRone (BUSPAR) 7.5 MG tablet, Take 1 tablet by mouth 2 (Two) Times a Day., Disp: 60 tablet, Rfl: 3    cyanocobalamin 1000 MCG/ML injection, Inject 1 mL into the appropriate muscle as directed by prescriber Every 30 (Thirty) Days., Disp: 1 mL, Rfl: 8    folic acid (FOLVITE) 1 MG tablet, Take 1 tablet by mouth Daily., Disp: 90 tablet, Rfl: 1    gabapentin (Neurontin) 600 MG tablet, Take 1 tablet by mouth 3 (Three) Times a Day., Disp: 270 tablet, Rfl: 1    meloxicam (MOBIC) 15 MG tablet, Take 1 tablet by mouth Daily As Needed (pain)., Disp: 90 tablet, Rfl: 3    pantoprazole (PROTONIX) 40 MG EC tablet, Take 1 tablet by mouth Daily., Disp: 90 tablet, Rfl: 3    promethazine (PHENERGAN) 12.5 MG tablet, Take 1 tablet by mouth Every 8 (Eight) Hours As Needed for Nausea. Uses sparingly, Disp: 24 tablet, Rfl: 2    Syringe 25G X 1\" 3 ML misc, To use with b12 injections, Disp: 25 each, Rfl: 0    tadalafil (Cialis) 5 MG tablet, Take 1 tablet by mouth Daily As Needed for Erectile Dysfunction. Indications: Benign Enlargement of Prostate, Erectile Dysfunction, Disp: 90 tablet, Rfl: 3    tamsulosin (FLOMAX) 0.4 MG capsule 24 hr capsule, Take 1 capsule by mouth Daily., Disp: 30 capsule, Rfl: 5    Venlafaxine HCl (EFFEXOR PO), Take  by mouth., Disp: , Rfl:     sucralfate (Carafate) 1 g tablet, Take 1 tablet by mouth 3 (Three) Times a Day., Disp: 90 tablet, Rfl: 4    Current Facility-Administered Medications:     nitroglycerin (NITROSTAT) SL tablet 0.4 mg, 0.4 mg, Sublingual, Q5 Min PRN, " Kahlil Mcmanus MD    sodium chloride 0.9 % flush 10 mL, 10 mL, Intravenous, PRN, Kahlil Mcmanus MD     Assessment and Plan:      Assessment and Plan {CC Problem List  Visit Diagnosis  ROS  Review (Popup)  Health Maintenance  Quality  BestPractice  Medications  SmartSets  SnapShot Encounters  Media :23}     Problem List Items Addressed This Visit          Musculoskeletal and Injuries    Chronic low back pain (Chronic)    Chronic neck pain - Primary (Chronic)     Other Visit Diagnoses       Need for influenza vaccination        Relevant Orders    Fluzone (or Fluarix & Flulaval for VFC) >6mos (Completed)          For chronic neck and back pain: he has not had significant change since his last follow up with neurosurgery.  He was referred to pain management but patient stated could not get in for months.     Imaging was to be done for neurosurgery: states they didn't call him and he has not called.    Today: asked  to assist and she will have someone to reach out to neurosurgery's office to facilitate his follow up.   I have not advised him to be off work and I do not see that his PCP has on prior notes.  He will take FMLA paper work with him to neurosurgery.     Follow Up {Instructions Charge Capture  Follow-up Communications :23}     Return if symptoms worsen or fail to improve.    Follow up with PCP as scheduled.     Patient was given instructions and counseling regarding his condition or for health maintenance advice. Please see specific information pulled into the AVS if appropriate.    Dragon disclaimer:   Much of this encounter note is an electronic transcription/translation of spoken language to printed text. The electronic translation of spoken language may permit erroneous, or at times, nonsensical words or phrases to be inadvertently transcribed; Although I have reviewed the note for such errors, some may still exist.     Additional Patient Counseling:       There are no Patient  Instructions on file for this visit.

## 2024-01-15 PROBLEM — M54.50 CHRONIC LOW BACK PAIN: Chronic | Status: ACTIVE | Noted: 2021-05-11

## 2024-01-15 PROBLEM — G89.29 CHRONIC LOW BACK PAIN: Chronic | Status: ACTIVE | Noted: 2021-05-11

## 2024-01-15 PROBLEM — M54.2 CHRONIC NECK PAIN: Chronic | Status: ACTIVE | Noted: 2021-05-11

## 2024-01-15 PROBLEM — G89.29 CHRONIC NECK PAIN: Chronic | Status: ACTIVE | Noted: 2021-05-11

## 2024-01-23 RX ORDER — CYANOCOBALAMIN 1000 UG/ML
INJECTION, SOLUTION INTRAMUSCULAR; SUBCUTANEOUS
Qty: 1 ML | Refills: 8 | OUTPATIENT
Start: 2024-01-23

## 2024-02-08 DIAGNOSIS — N13.8 BPH WITH OBSTRUCTION/LOWER URINARY TRACT SYMPTOMS: ICD-10-CM

## 2024-02-08 DIAGNOSIS — F41.9 ANXIETY: ICD-10-CM

## 2024-02-08 DIAGNOSIS — R39.15 BENIGN PROSTATIC HYPERPLASIA (BPH) WITH URINARY URGENCY: ICD-10-CM

## 2024-02-08 DIAGNOSIS — N52.8 OTHER MALE ERECTILE DYSFUNCTION: ICD-10-CM

## 2024-02-08 DIAGNOSIS — N40.1 BPH WITH OBSTRUCTION/LOWER URINARY TRACT SYMPTOMS: ICD-10-CM

## 2024-02-08 DIAGNOSIS — N40.1 BENIGN PROSTATIC HYPERPLASIA (BPH) WITH URINARY URGENCY: ICD-10-CM

## 2024-02-09 RX ORDER — TADALAFIL 5 MG/1
5 TABLET ORAL DAILY PRN
Qty: 90 TABLET | Refills: 3 | Status: SHIPPED | OUTPATIENT
Start: 2024-02-09

## 2024-02-09 RX ORDER — BUSPIRONE HYDROCHLORIDE 7.5 MG/1
7.5 TABLET ORAL 2 TIMES DAILY
Qty: 60 TABLET | Refills: 3 | Status: SHIPPED | OUTPATIENT
Start: 2024-02-09

## 2024-02-09 NOTE — TELEPHONE ENCOUNTER
Rx Refill Note  Requested Prescriptions     Pending Prescriptions Disp Refills    busPIRone (BUSPAR) 7.5 MG tablet 60 tablet 3     Sig: Take 1 tablet by mouth 2 (Two) Times a Day.      Last office visit with prescribing clinician: 9/14/2023   Last telemedicine visit with prescribing clinician: Visit date not found   Next office visit with prescribing clinician: 2/8/2024

## 2024-02-09 NOTE — TELEPHONE ENCOUNTER
Rx Refill Note  Requested Prescriptions     Pending Prescriptions Disp Refills    tadalafil (Cialis) 5 MG tablet 90 tablet 3     Sig: Take 1 tablet by mouth Daily As Needed for Erectile Dysfunction. Indications: Benign Enlargement of Prostate, Erectile Dysfunction      Last office visit with prescribing clinician: 9/14/2023   Last telemedicine visit with prescribing clinician: Visit date not found   Next office visit with prescribing clinician: Visit date not found

## 2024-02-22 ENCOUNTER — OFFICE VISIT (OUTPATIENT)
Dept: INTERNAL MEDICINE | Facility: CLINIC | Age: 59
End: 2024-02-22
Payer: MEDICARE

## 2024-02-22 VITALS
HEIGHT: 66 IN | DIASTOLIC BLOOD PRESSURE: 84 MMHG | OXYGEN SATURATION: 98 % | SYSTOLIC BLOOD PRESSURE: 136 MMHG | HEART RATE: 74 BPM | WEIGHT: 142 LBS | BODY MASS INDEX: 22.82 KG/M2

## 2024-02-22 DIAGNOSIS — G89.29 CHRONIC MIDLINE LOW BACK PAIN, UNSPECIFIED WHETHER SCIATICA PRESENT: Primary | ICD-10-CM

## 2024-02-22 DIAGNOSIS — M54.50 CHRONIC MIDLINE LOW BACK PAIN, UNSPECIFIED WHETHER SCIATICA PRESENT: Primary | ICD-10-CM

## 2024-02-22 PROCEDURE — 1159F MED LIST DOCD IN RCRD: CPT

## 2024-02-22 PROCEDURE — 3075F SYST BP GE 130 - 139MM HG: CPT

## 2024-02-22 PROCEDURE — 99213 OFFICE O/P EST LOW 20 MIN: CPT

## 2024-02-22 PROCEDURE — 3079F DIAST BP 80-89 MM HG: CPT

## 2024-02-22 PROCEDURE — 1160F RVW MEDS BY RX/DR IN RCRD: CPT

## 2024-02-22 RX ORDER — METHOCARBAMOL 750 MG/1
750 TABLET, FILM COATED ORAL
COMMUNITY
Start: 2023-10-19

## 2024-02-22 RX ORDER — HYDROCODONE BITARTRATE AND ACETAMINOPHEN 5; 325 MG/1; MG/1
2 TABLET ORAL EVERY 6 HOURS PRN
Qty: 24 TABLET | Refills: 0 | Status: SHIPPED | OUTPATIENT
Start: 2024-02-22

## 2024-02-22 NOTE — PROGRESS NOTES
"Chief Complaint  Generalized Body Aches    Subjective        Kaiser Grier presents to [facility]  History of Present Illness  58-year-old male presenting with chronic back pain.  Patient of LAUREEN Mirza.  MRI of cervical and thoracic spine completed last year.  Scheduled to see neurosurgery on 3/8.  States is unable to see pain management until May.  Is in acute distress due to pain.  States that it has been affecting his work and relationships.  Has tried multiple muscle relaxers and high doses of gabapentin which are not relieving the pain.  Would like to consider a pain pump in the future for relief.  Discussed keeping options open for other treatments that might alleviate pain such as surgery, TENS unit and other medications.      Objective   Vital Signs:  /84 (BP Location: Right arm, Patient Position: Sitting, Cuff Size: Adult)   Pulse 74   Ht 167.6 cm (66\")   Wt 64.4 kg (142 lb)   SpO2 98%   BMI 22.92 kg/m²   Estimated body mass index is 22.92 kg/m² as calculated from the following:    Height as of this encounter: 167.6 cm (66\").    Weight as of this encounter: 64.4 kg (142 lb).       BMI is within normal parameters. No other follow-up for BMI required.      Physical Exam  Vitals reviewed.   Constitutional:       Appearance: Normal appearance.   Musculoskeletal:         General: Normal range of motion.      Cervical back: Normal range of motion.   Skin:     General: Skin is warm and dry.      Capillary Refill: Capillary refill takes less than 2 seconds.   Neurological:      General: No focal deficit present.      Mental Status: He is alert and oriented to person, place, and time.   Psychiatric:         Mood and Affect: Mood normal.         Behavior: Behavior normal.         Thought Content: Thought content normal.         Judgment: Judgment normal.        Result Review :      Common labs          5/25/2023    00:00   Common Labs   Glucose 83    BUN 9  " "  Creatinine 1.11    Sodium 141    Potassium 4.7    Chloride 104    Calcium 8.9    Total Protein 6.8    Albumin 4.3    Total Bilirubin 0.3    Alkaline Phosphatase 54    AST (SGOT) 18    ALT (SGPT) 10    WBC 6.3    Hemoglobin 11.8    Hematocrit 36.5    Platelets 238    Total Cholesterol 142    Triglycerides 81    HDL Cholesterol 48    LDL Cholesterol  78    PSA 0.6        Current Outpatient Medications on File Prior to Visit   Medication Sig Dispense Refill    B-D INSULIN SYRINGE 1CC/25GX1\" 25G X 1\" 1 ML misc USE WITH B12 INJECTIONS      busPIRone (BUSPAR) 7.5 MG tablet Take 1 tablet by mouth 2 (Two) Times a Day. 60 tablet 3    cyanocobalamin 1000 MCG/ML injection Inject 1 mL into the appropriate muscle as directed by prescriber Every 30 (Thirty) Days. 1 mL 8    folic acid (FOLVITE) 1 MG tablet Take 1 tablet by mouth Daily. 90 tablet 1    gabapentin (Neurontin) 600 MG tablet Take 1 tablet by mouth 3 (Three) Times a Day. 270 tablet 1    meloxicam (MOBIC) 15 MG tablet Take 1 tablet by mouth Daily As Needed (pain). 90 tablet 3    methocarbamol (ROBAXIN) 750 MG tablet Take 1 tablet by mouth.      pantoprazole (PROTONIX) 40 MG EC tablet Take 1 tablet by mouth Daily. 90 tablet 3    promethazine (PHENERGAN) 12.5 MG tablet Take 1 tablet by mouth Every 8 (Eight) Hours As Needed for Nausea. Uses sparingly 24 tablet 2    sucralfate (Carafate) 1 g tablet Take 1 tablet by mouth 3 (Three) Times a Day. 90 tablet 4    Syringe 25G X 1\" 3 ML misc To use with b12 injections 25 each 0    tadalafil (Cialis) 5 MG tablet Take 1 tablet by mouth Daily As Needed for Erectile Dysfunction. Indications: Benign Enlargement of Prostate, Erectile Dysfunction 90 tablet 3    tamsulosin (FLOMAX) 0.4 MG capsule 24 hr capsule Take 1 capsule by mouth Daily. 30 capsule 5    Venlafaxine HCl (EFFEXOR PO) Take  by mouth.       Current Facility-Administered Medications on File Prior to Visit   Medication Dose Route Frequency Provider Last Rate Last Admin    " nitroglycerin (NITROSTAT) SL tablet 0.4 mg  0.4 mg Sublingual Q5 Min PRN Kahlil Mcmanus MD        sodium chloride 0.9 % flush 10 mL  10 mL Intravenous PRN Kahlil Mcmanus MD                    Assessment and Plan     Diagnoses and all orders for this visit:    1. Chronic midline low back pain, unspecified whether sciatica present (Primary)  -     HYDROcodone-acetaminophen (Norco) 5-325 MG per tablet; Take 2 tablets by mouth Every 6 (Six) Hours As Needed for Moderate Pain.  Dispense: 24 tablet; Refill: 0      Patient Instructions   Take Norco sparingly. Stay hydrated with water. Keep upcoming appointment with neurosurgery and pain management. Recommend going to ER if pain becomes unbearable.           Follow Up     Return if symptoms worsen or fail to improve.  Patient was given instructions and counseling regarding his condition or for health maintenance advice. Please see specific information pulled into the AVS if appropriate.

## 2024-02-22 NOTE — PATIENT INSTRUCTIONS
Take Norco sparingly. Stay hydrated with water. Keep upcoming appointment with neurosurgery and pain management. Recommend going to ER if pain becomes unbearable.

## 2024-02-23 RX ORDER — CYANOCOBALAMIN 1000 UG/ML
INJECTION, SOLUTION INTRAMUSCULAR; SUBCUTANEOUS
Qty: 1 ML | Refills: 8 | OUTPATIENT
Start: 2024-02-23

## 2024-03-18 ENCOUNTER — TELEPHONE (OUTPATIENT)
Dept: INTERNAL MEDICINE | Facility: CLINIC | Age: 59
End: 2024-03-18
Payer: MEDICARE

## 2024-03-18 NOTE — TELEPHONE ENCOUNTER
Caller: Kaiser Grier    Relationship: Self    Best call back number:     What is the best time to reach you:     Who are you requesting to speak with (clinical staff, provider,  specific staff member):     Do you know the name of the person who called:     What was the call regarding: PATIENT IS CALLING IN TO REQUEST A CALL BACK AS HE IS CALLING IN TO CHECK ON THE STATUS OF HIS FMLA DOCUMENTS.      Is it okay if the provider responds through [employer]: YES

## 2024-03-19 ENCOUNTER — TELEPHONE (OUTPATIENT)
Dept: INTERNAL MEDICINE | Facility: CLINIC | Age: 59
End: 2024-03-19
Payer: MEDICARE

## 2024-03-19 NOTE — TELEPHONE ENCOUNTER
Attempted to call pt to see which provider was supposed to fill out his FMLA paperwork     Voicemail box not set up       Hub to transfer over

## 2024-03-19 NOTE — TELEPHONE ENCOUNTER
Pt called to get fmla paperwork filled out let pt know he would need and apt patient states he left paperwork at the  unable to find paperwork advised pt to just have it faxed over to office pt was not satisfied and hun up on MA

## 2024-04-10 ENCOUNTER — OFFICE VISIT (OUTPATIENT)
Dept: INTERNAL MEDICINE | Facility: CLINIC | Age: 59
End: 2024-04-10
Payer: MEDICARE

## 2024-04-10 VITALS
DIASTOLIC BLOOD PRESSURE: 70 MMHG | WEIGHT: 143.4 LBS | BODY MASS INDEX: 23.05 KG/M2 | TEMPERATURE: 98.9 F | HEIGHT: 66 IN | SYSTOLIC BLOOD PRESSURE: 112 MMHG | OXYGEN SATURATION: 95 % | HEART RATE: 61 BPM

## 2024-04-10 DIAGNOSIS — G89.29 CHRONIC NECK PAIN: ICD-10-CM

## 2024-04-10 DIAGNOSIS — M54.50 CHRONIC MIDLINE LOW BACK PAIN, UNSPECIFIED WHETHER SCIATICA PRESENT: Primary | ICD-10-CM

## 2024-04-10 DIAGNOSIS — M54.2 CHRONIC NECK PAIN: ICD-10-CM

## 2024-04-10 DIAGNOSIS — F41.9 ANXIETY: ICD-10-CM

## 2024-04-10 DIAGNOSIS — G89.29 CHRONIC MIDLINE LOW BACK PAIN, UNSPECIFIED WHETHER SCIATICA PRESENT: Primary | ICD-10-CM

## 2024-04-10 RX ORDER — BUSPIRONE HYDROCHLORIDE 7.5 MG/1
7.5 TABLET ORAL 2 TIMES DAILY
Qty: 60 TABLET | Refills: 3 | Status: SHIPPED | OUTPATIENT
Start: 2024-04-10

## 2024-04-10 NOTE — PROGRESS NOTES
Chief Complaint  Anxiety and Back Pain     Subjective:      History of Present Illness {CC  Problem List  Visit  Diagnosis   Encounters  Notes  Medications  Labs  Result Review Imaging  Media :23}     Kaiser Grier presents to [facility] for:      History of Present Illness     Pt is seeing Dr. Price for low back pain. Pt states that he was on long term disability and when he came back he was switched jobs. Pt states this is making his back pain worse. Pt states that he has a MRI scheduled for 5/5/2024. Pt states that after this he will see MD Albert and determine next steps. Pt states that his back never stops hurting and he cannot find relief. Pt states that the bending and standing increase the pain. Pt states that is on his feet for 11.5 hours for up to 6 days a week. 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 Pt states he has numbness and tingling in his left leg.     Patient also states his anxiety is not well-controlled due to pain.  Patient states that sometimes his anxiety gets so bad because he does not feel like he is ever going to get his pain managed.  Patient states this is hard to work during these periods of anxiety.      I have reviewed patient's medical history, any new submitted information provided by patient or medical assistant and updated medical record.      Objective:      Physical Exam  Vitals reviewed.   Constitutional:       Appearance: Normal appearance.   HENT:      Head: Normocephalic.   Cardiovascular:      Rate and Rhythm: Normal rate and regular rhythm.      Pulses: Normal pulses.      Heart sounds: Normal heart sounds.   Pulmonary:      Effort: Pulmonary effort is normal.      Breath sounds: Normal breath sounds.   Musculoskeletal:      Lumbar back: Spasms and tenderness present. Decreased range of motion. Positive right straight leg raise test and positive left straight leg raise test.   Skin:      "General: Skin is warm and dry.      Capillary Refill: Capillary refill takes less than 2 seconds.   Neurological:      General: No focal deficit present.      Mental Status: He is alert.   Psychiatric:         Mood and Affect: Mood normal.         Behavior: Behavior normal. Behavior is agitated.        Result Review  Data Reviewed:{ Labs  Result Review  Imaging  Med Tab  Media :23}                Vital Signs:   /70 (BP Location: Right arm, Patient Position: Sitting, Cuff Size: Adult)   Pulse 61   Temp 98.9 °F (37.2 °C) (Oral)   Ht 167.6 cm (66\")   Wt 65 kg (143 lb 6.4 oz)   SpO2 95%   BMI 23.15 kg/m²         Requested Prescriptions     Signed Prescriptions Disp Refills    busPIRone (BUSPAR) 7.5 MG tablet 60 tablet 3     Sig: Take 1 tablet by mouth 2 (Two) Times a Day.       Routine medications provided by this office will also be refilled via pharmacy request.       Current Outpatient Medications:     B-D INSULIN SYRINGE 1CC/25GX1\" 25G X 1\" 1 ML misc, USE WITH B12 INJECTIONS, Disp: , Rfl:     busPIRone (BUSPAR) 7.5 MG tablet, Take 1 tablet by mouth 2 (Two) Times a Day., Disp: 60 tablet, Rfl: 3    cyanocobalamin 1000 MCG/ML injection, Inject 1 mL into the appropriate muscle as directed by prescriber Every 30 (Thirty) Days., Disp: 1 mL, Rfl: 8    folic acid (FOLVITE) 1 MG tablet, Take 1 tablet by mouth Daily., Disp: 90 tablet, Rfl: 1    gabapentin (Neurontin) 600 MG tablet, Take 1 tablet by mouth 3 (Three) Times a Day., Disp: 270 tablet, Rfl: 1    HYDROcodone-acetaminophen (Norco) 5-325 MG per tablet, Take 2 tablets by mouth Every 6 (Six) Hours As Needed for Moderate Pain., Disp: 24 tablet, Rfl: 0    meloxicam (MOBIC) 15 MG tablet, Take 1 tablet by mouth Daily As Needed (pain)., Disp: 90 tablet, Rfl: 3    methocarbamol (ROBAXIN) 750 MG tablet, Take 1 tablet by mouth., Disp: , Rfl:     pantoprazole (PROTONIX) 40 MG EC tablet, Take 1 tablet by mouth Daily., Disp: 90 tablet, Rfl: 3    promethazine " "(PHENERGAN) 12.5 MG tablet, Take 1 tablet by mouth Every 8 (Eight) Hours As Needed for Nausea. Uses sparingly, Disp: 24 tablet, Rfl: 2    sucralfate (Carafate) 1 g tablet, Take 1 tablet by mouth 3 (Three) Times a Day., Disp: 90 tablet, Rfl: 4    Syringe 25G X 1\" 3 ML misc, To use with b12 injections, Disp: 25 each, Rfl: 0    tadalafil (Cialis) 5 MG tablet, Take 1 tablet by mouth Daily As Needed for Erectile Dysfunction. Indications: Benign Enlargement of Prostate, Erectile Dysfunction, Disp: 90 tablet, Rfl: 3    tamsulosin (FLOMAX) 0.4 MG capsule 24 hr capsule, Take 1 capsule by mouth Daily., Disp: 30 capsule, Rfl: 5    Venlafaxine HCl (EFFEXOR PO), Take  by mouth., Disp: , Rfl:     Current Facility-Administered Medications:     nitroglycerin (NITROSTAT) SL tablet 0.4 mg, 0.4 mg, Sublingual, Q5 Min PRN, Kahlil Mcmanus MD    sodium chloride 0.9 % flush 10 mL, 10 mL, Intravenous, PRN, Kahlil Mcmanus MD     Assessment and Plan:      Assessment and Plan {CC Problem List  Visit Diagnosis  ROS  Review (Popup)  Health Maintenance  Quality  BestPractice  Medications  SmartSets  SnapShot Encounters  Media :23}     Problem List Items Addressed This Visit       Chronic low back pain - Primary (Chronic)    Chronic neck pain (Chronic)    Anxiety    Relevant Medications    busPIRone (BUSPAR) 7.5 MG tablet     Educated patient that I will fill out his FMLA form about his back until patient can be seen by neurosurgery.  Educated patient that neurosurgery needs to be the ones to fill out this paperwork as they will be managing his time off and plan of care when it comes to his back.  Will reassess in a few months if patient would like to discuss this further.  Patient verbalized understanding and is comfortable with plan of care.    Follow Up {Instructions Charge Capture  Follow-up Communications :23}     No follow-ups on file.      Patient was given instructions and counseling regarding his condition or for health " maintenance advice. Please see specific information pulled into the AVS if appropriate.    Bailey disclaimer:   Much of this encounter note is an electronic transcription/translation of spoken language to printed text. The electronic translation of spoken language may permit erroneous, or at times, nonsensical words or phrases to be inadvertently transcribed; Although I have reviewed the note for such errors, some may still exist.     Additional Patient Counseling:       There are no Patient Instructions on file for this visit.

## 2024-04-11 ENCOUNTER — TELEPHONE (OUTPATIENT)
Dept: INTERNAL MEDICINE | Facility: CLINIC | Age: 59
End: 2024-04-11
Payer: MEDICARE

## 2024-04-11 NOTE — TELEPHONE ENCOUNTER
Caller: Kaiser Grier    Relationship: Self    Best call back number: [phone]    What was the call regarding: STATED THAT THEY CAN  THE [facility] PAPERWORK MONDAY BEFORE 4 PM IF THAT IS OKAY. PLEASE CALL AND ADVISE FURTHER

## 2024-05-01 ENCOUNTER — OFFICE VISIT (OUTPATIENT)
Dept: INTERNAL MEDICINE | Facility: CLINIC | Age: 59
End: 2024-05-01
Payer: MEDICARE

## 2024-05-01 ENCOUNTER — NURSE TRIAGE (OUTPATIENT)
Dept: CALL CENTER | Facility: HOSPITAL | Age: 59
End: 2024-05-01
Payer: MEDICARE

## 2024-05-01 VITALS
HEART RATE: 51 BPM | OXYGEN SATURATION: 98 % | DIASTOLIC BLOOD PRESSURE: 62 MMHG | BODY MASS INDEX: 22.18 KG/M2 | SYSTOLIC BLOOD PRESSURE: 130 MMHG | TEMPERATURE: 99 F | WEIGHT: 138 LBS | HEIGHT: 66 IN

## 2024-05-01 DIAGNOSIS — F41.9 ANXIETY: ICD-10-CM

## 2024-05-01 DIAGNOSIS — F33.2 SEVERE EPISODE OF RECURRENT MAJOR DEPRESSIVE DISORDER, WITHOUT PSYCHOTIC FEATURES: Primary | ICD-10-CM

## 2024-05-01 PROBLEM — F32.A DEPRESSION: Chronic | Status: ACTIVE | Noted: 2021-05-11

## 2024-05-01 PROCEDURE — 3075F SYST BP GE 130 - 139MM HG: CPT

## 2024-05-01 PROCEDURE — 99214 OFFICE O/P EST MOD 30 MIN: CPT

## 2024-05-01 PROCEDURE — 3078F DIAST BP <80 MM HG: CPT

## 2024-05-01 RX ORDER — HYDROXYZINE HYDROCHLORIDE 25 MG/1
25 TABLET, FILM COATED ORAL 3 TIMES DAILY PRN
Qty: 90 TABLET | Refills: 0 | Status: SHIPPED | OUTPATIENT
Start: 2024-05-01

## 2024-05-01 RX ORDER — ESCITALOPRAM OXALATE 10 MG/1
10 TABLET ORAL DAILY
Qty: 30 TABLET | Refills: 1 | Status: SHIPPED | OUTPATIENT
Start: 2024-05-01

## 2024-05-01 NOTE — LETTER
May 1, 2024     Patient: Kaiser Grier   YOB: 1965   Date of Visit: 5/1/2024       To Whom It May Concern:    It is my medical opinion that Kaiser Grier may return to work 5/8/2024, pending decision made by his specialist doctor.          Sincerely,        LAUREEN Cook    CC: No Recipients

## 2024-05-01 NOTE — ASSESSMENT & PLAN NOTE
Patient's depression is a recurrent episode that is severe without psychosis. Depression is active and worsening.    Plan:   Begin new antidepressant medicine; Lexapro  Referral to psychiatry    Followup in 4 weeks.

## 2024-05-01 NOTE — TELEPHONE ENCOUNTER
Reason for Disposition   [1] Started on anti-anxiety medication AND [2] no relief    Additional Information   Negative: SEVERE difficulty breathing (e.g., struggling for each breath, speaks in single words)   Negative: Bluish (or gray) lips or face now   Negative: Difficult to awaken or acting confused (e.g., disoriented, slurred speech)   Negative: Violent behavior, or threatening to physically hurt or kill someone   Negative: Sounds like a life-threatening emergency to the triager   Negative: Chest pain   Negative: Palpitations, skipped heartbeat, or rapid heartbeat is main symptom   Negative: Cough is main symptom   Negative: Suicide thoughts, threats, attempts, or questions   Negative: Depression is main problem or symptom (e.g., feelings of sadness or hopelessness)   Negative: [1] Difficulty breathing AND [2] persists > 10 minutes AND [3] not relieved by reassurance provided by triager   Negative: [1] Lightheadedness or dizziness AND [2] persists > 10 minutes AND [3] not relieved by reassurance provided by triager   Negative: [1] SEVERE anxiety (e.g., extremely anxious with intense emotional symptoms such as feeling of unreality, urge to flee, unable to calm down; unable to cope or function) AND [2] not better after 10 minutes of reassurance and Care Advice   Negative: [1] Panic attack symptoms (e.g., sudden onset of intense fear and symptoms such as dizziness, feeling of impending doom or fear of dying, hyperventilation, numbness or tingling, sweating, trembling) AND [2] has not been evaluated for this by doctor (or NP/PA)   Negative: [1] Panic attack symptoms (diagnosed in the past) AND [2] not better with usual treatment, reassurance, or Care Advice   Negative: [1] Alcohol or drug use, known or suspected AND [2] feeling very shaky (i.e., visible tremors of hands)   Negative: Patient sounds very sick or weak to the triager   Negative: Patient sounds very upset or troubled to the triager   Negative: MODERATE  "anxiety (e.g., persistent or frequent anxiety symptoms; interferes with sleep, school, or work)   Negative: [1] Anxiety symptoms AND [2] has not been evaluated for this by doctor (or NP/PA)    Answer Assessment - Initial Assessment Questions  1. CONCERN: \"Did anything happen that prompted you to call today?\"       Has ptsd, stress full job  2. ANXIETY SYMPTOMS: \"Can you describe how you (your loved one; patient) have been feeling?\" (e.g., tense, restless, panicky, anxious, keyed up, overwhelmed, sense of impending doom).       Anxious, stressed  3. ONSET: \"How long have you been feeling this way?\" (e.g., hours, days, weeks)      More than three months  4. SEVERITY: \"How would you rate the level of anxiety?\" (e.g., 0 - 10; or mild, moderate, severe).      moderate  5. FUNCTIONAL IMPAIRMENT: \"How have these feelings affected your ability to do daily activities?\" \"Have you had more difficulty than usual doing your normal daily activities?\" (e.g., getting better, same, worse; self-care, school, work, interactions)      Still working  6. HISTORY: \"Have you felt this way before?\" \"Have you ever been diagnosed with an anxiety problem in the past?\" (e.g., generalized anxiety disorder, panic attacks, PTSD). If Yes, ask: \"How was this problem treated?\" (e.g., medicines, counseling, etc.)      yes  7. RISK OF HARM - SUICIDAL IDEATION: \"Do you ever have thoughts of hurting or killing yourself?\" If Yes, ask:  \"Do you have these feelings now?\" \"Do you have a plan on how you would do this?\"      No plan but has thought about it in the past  8. TREATMENT:  \"What has been done so far to treat this anxiety?\" (e.g., medicines, relaxation strategies). \"What has helped?\"      Has been on buspirone for the last 3 months but feels like not working  9. TREATMENT - THERAPIST: \"Do you have a counselor or therapist? Name?\"      Does not have therapist  10. POTENTIAL TRIGGERS: \"Do you drink caffeinated beverages (e.g., coffee, catherine, teas), " "and how much daily?\" \"Do you drink alcohol or use any drugs?\" \"Have you started any new medicines recently?\"        na  11. PATIENT SUPPORT: \"Who is with you now?\" \"Who do you live with?\" \"Do you have family or friends who you can talk to?\"         Wife supportative  12. OTHER SYMPTOMS: \"Do you have any other symptoms?\" (e.g., feeling depressed, trouble concentrating, trouble sleeping, trouble breathing, palpitations or fast heartbeat, chest pain, sweating, nausea, or diarrhea)        na  13. PREGNANCY: \"Is there any chance you are pregnant?\" \"When was your last menstrual period?\"        na    Protocols used: Anxiety and Panic Attack-ADULT-AH  Transferred to pcp for appointment to be made asap  "
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
## 2024-05-30 NOTE — PRE-PROCEDURE INSTRUCTIONS
Patient instructed to have no food past midnight, clears up to 2 hours prior to arrival time. Patient instructed to wear no lotions, jewelry or piercing's day of surgery.  Patient to able to take venlafaxine, gabapentin, Protonix am of surgery.     Pt with severe high anion gap metabolic acidosis in setting of ROBERT, infectious process and hypotension. Improved with HD. SCO2 of 21. Monitor SCO2.

## 2024-06-19 RX ORDER — CYANOCOBALAMIN 1000 UG/ML
INJECTION, SOLUTION INTRAMUSCULAR; SUBCUTANEOUS
Qty: 1 ML | Refills: 8 | OUTPATIENT
Start: 2024-06-19

## 2024-06-20 RX ORDER — CYANOCOBALAMIN 1000 UG/ML
INJECTION, SOLUTION INTRAMUSCULAR; SUBCUTANEOUS
Qty: 1 ML | Refills: 8 | OUTPATIENT
Start: 2024-06-20

## 2024-07-17 DIAGNOSIS — F33.2 SEVERE EPISODE OF RECURRENT MAJOR DEPRESSIVE DISORDER, WITHOUT PSYCHOTIC FEATURES: ICD-10-CM

## 2024-07-17 DIAGNOSIS — F41.9 ANXIETY: ICD-10-CM

## 2024-07-17 RX ORDER — ESCITALOPRAM OXALATE 10 MG/1
10 TABLET ORAL DAILY
Qty: 30 TABLET | Refills: 1 | Status: SHIPPED | OUTPATIENT
Start: 2024-07-17

## 2024-07-24 ENCOUNTER — OFFICE VISIT (OUTPATIENT)
Dept: INTERNAL MEDICINE | Facility: CLINIC | Age: 59
End: 2024-07-24
Payer: COMMERCIAL

## 2024-07-24 ENCOUNTER — HOSPITAL ENCOUNTER (OUTPATIENT)
Facility: HOSPITAL | Age: 59
Discharge: HOME | End: 2024-07-24
Payer: COMMERCIAL

## 2024-07-24 VITALS
DIASTOLIC BLOOD PRESSURE: 72 MMHG | SYSTOLIC BLOOD PRESSURE: 116 MMHG | HEIGHT: 66 IN | OXYGEN SATURATION: 98 % | TEMPERATURE: 98.5 F | WEIGHT: 141.8 LBS | BODY MASS INDEX: 22.79 KG/M2 | HEART RATE: 61 BPM

## 2024-07-24 DIAGNOSIS — N52.8 OTHER MALE ERECTILE DYSFUNCTION: ICD-10-CM

## 2024-07-24 DIAGNOSIS — N41.1 CHRONIC PROSTATITIS: ICD-10-CM

## 2024-07-24 DIAGNOSIS — R82.998 DARK URINE: ICD-10-CM

## 2024-07-24 DIAGNOSIS — N40.1 BPH WITH OBSTRUCTION/LOWER URINARY TRACT SYMPTOMS: ICD-10-CM

## 2024-07-24 DIAGNOSIS — R30.0 DYSURIA: Primary | ICD-10-CM

## 2024-07-24 DIAGNOSIS — N13.8 BPH WITH OBSTRUCTION/LOWER URINARY TRACT SYMPTOMS: ICD-10-CM

## 2024-07-24 DIAGNOSIS — D51.9 ANEMIA DUE TO VITAMIN B12 DEFICIENCY, UNSPECIFIED B12 DEFICIENCY TYPE: ICD-10-CM

## 2024-07-24 LAB
ALBUMIN SERPL-MCNC: 4.3 G/DL (ref 3.5–5.2)
ALBUMIN/GLOB SERPL: 1.8 G/DL
ALP SERPL-CCNC: 59 U/L (ref 39–117)
ALT SERPL-CCNC: 9 U/L (ref 1–41)
AST SERPL-CCNC: 12 U/L (ref 1–40)
BILIRUB BLD-MCNC: NEGATIVE MG/DL
BILIRUB SERPL-MCNC: <0.2 MG/DL (ref 0–1.2)
BUN SERPL-MCNC: 9 MG/DL (ref 8–23)
BUN/CREAT SERPL: 9.6 (ref 7–25)
CALCIUM SERPL-MCNC: 8.7 MG/DL (ref 8.6–10.5)
CHLORIDE SERPL-SCNC: 101 MMOL/L (ref 98–107)
CLARITY, POC: CLEAR
CO2 SERPL-SCNC: 28.9 MMOL/L (ref 22–29)
COLOR UR: YELLOW
CREAT SERPL-MCNC: 0.94 MG/DL (ref 0.76–1.27)
EGFRCR SERPLBLD CKD-EPI 2021: 92.8 ML/MIN/1.73
ERYTHROCYTE [DISTWIDTH] IN BLOOD BY AUTOMATED COUNT: 13.6 % (ref 12.3–15.4)
EXPIRATION DATE: NORMAL
GLOBULIN SER CALC-MCNC: 2.4 GM/DL
GLUCOSE SERPL-MCNC: 89 MG/DL (ref 65–99)
GLUCOSE UR STRIP-MCNC: NEGATIVE MG/DL
HCT VFR BLD AUTO: 36.8 % (ref 37.5–51)
HGB BLD-MCNC: 11.5 G/DL (ref 13–17.7)
KETONES UR QL: NEGATIVE
LEUKOCYTE EST, POC: NEGATIVE
Lab: NORMAL
MCH RBC QN AUTO: 31.5 PG (ref 26.6–33)
MCHC RBC AUTO-ENTMCNC: 31.3 G/DL (ref 31.5–35.7)
MCV RBC AUTO: 100.8 FL (ref 79–97)
NITRITE UR-MCNC: NEGATIVE MG/ML
PH UR: 7.5 [PH] (ref 5–8)
PLATELET # BLD AUTO: 238 10*3/MM3 (ref 140–450)
POTASSIUM SERPL-SCNC: 4.5 MMOL/L (ref 3.5–5.2)
PROT SERPL-MCNC: 6.7 G/DL (ref 6–8.5)
PROT UR STRIP-MCNC: NEGATIVE MG/DL
RBC # BLD AUTO: 3.65 10*6/MM3 (ref 4.14–5.8)
RBC # UR STRIP: NEGATIVE /UL
SODIUM SERPL-SCNC: 138 MMOL/L (ref 136–145)
SP GR UR: 1.02 (ref 1–1.03)
UROBILINOGEN UR QL: NORMAL
VIT B12 SERPL-MCNC: 1096 PG/ML (ref 211–946)
WBC # BLD AUTO: 7.8 10*3/MM3 (ref 3.4–10.8)

## 2024-07-24 PROCEDURE — 74018 RADEX ABDOMEN 1 VIEW: CPT

## 2024-07-24 PROCEDURE — 81003 URINALYSIS AUTO W/O SCOPE: CPT

## 2024-07-24 PROCEDURE — 99214 OFFICE O/P EST MOD 30 MIN: CPT

## 2024-07-24 RX ORDER — TAMSULOSIN HYDROCHLORIDE 0.4 MG/1
1 CAPSULE ORAL DAILY
Qty: 30 CAPSULE | Refills: 5 | Status: SHIPPED | OUTPATIENT
Start: 2024-07-24

## 2024-07-24 NOTE — PROGRESS NOTES
Chief Complaint  Abdominal Pain and Blood in Urine     Subjective:      History of Present Illness {CC  Problem List  Visit  Diagnosis   Encounters  Notes  Medications  Labs  Result Review Imaging  Media :23}     [facility] presents to [facility] for:      History of Present Illness     Pt states he has had trouble urinating for a few weeks. Pt states he has dark urine and abdominal pain. Pt states this is intermittent. Pt stats when the pain comes he feels like he needs to sit on the toilet. Pt also complains of dysuria. Pt does have a urologist, but states he has not taken his prostate medications in a while. [facility], but does not have an appt. patient states he does not like the urologist that he sees, and would like another provider.     Patient is still having problems with chronic pain.  Patient states he would like to have medical marijuana to help with this as he is tired of taking pain medications.      I have reviewed patient's medical history, any new submitted information provided by patient or medical assistant and updated medical record.      Objective:      Physical Exam  Vitals reviewed.   Constitutional:       Appearance: Normal appearance.   HENT:      Head: Normocephalic.   Cardiovascular:      Rate and Rhythm: Normal rate and regular rhythm.      Pulses: Normal pulses.      Heart sounds: Normal heart sounds.   Pulmonary:      Effort: Pulmonary effort is normal.      Breath sounds: Normal breath sounds.   Abdominal:      General: Abdomen is flat. Bowel sounds are normal. There is no distension.      Palpations: Abdomen is soft. There is no mass.      Tenderness: There is no abdominal tenderness. There is no right CVA tenderness or left CVA tenderness.   Skin:     General: Skin is warm and dry.      Capillary Refill: Capillary refill takes less than 2 seconds.   Neurological:      General: No focal deficit present.       "Mental Status: He is alert.   Psychiatric:         Mood and Affect: Mood normal.         Behavior: Behavior normal.        Result Review  Data Reviewed:{ Labs  Result Review  Imaging  Med Tab  Media :23}         URINE DIPSTICK:  Lab Results - Last 18 Months   Lab Units 07/24/24  1431   COLOR UA  Yellow   CLARITY UA  Clear   SPECIFIC GRAVITY, URINE  1.020   PH, URINE  7.5   LEUKOCYTES UA  Negative   NITRITE UA  Negative   PROTEIN UA mg/dL Negative   UROBILINOGEN UA  0.2 E.U./dL   BILIRUBIN UA  Negative   BLOOD UA  Negative          Vital Signs:   /72 (BP Location: Left arm, Patient Position: Sitting, Cuff Size: Adult)   Pulse 61   Temp 98.5 °F (36.9 °C) (Oral)   Ht 167.6 cm (66\")   Wt 64.3 kg (141 lb 12.8 oz)   SpO2 98%   BMI 22.89 kg/m²   Estimated body mass index is 22.89 kg/m² as calculated from the following:    Height as of this encounter: 167.6 cm (66\").    Weight as of this encounter: 64.3 kg (141 lb 12.8 oz).        Requested Prescriptions     Signed Prescriptions Disp Refills    tamsulosin (FLOMAX) 0.4 MG capsule 24 hr capsule 30 capsule 5     Sig: Take 1 capsule by mouth Daily.       Routine medications provided by this office will also be refilled via pharmacy request.       Current Outpatient Medications:     B-D INSULIN SYRINGE 1CC/25GX1\" 25G X 1\" 1 ML misc, USE WITH B12 INJECTIONS, Disp: , Rfl:     busPIRone (BUSPAR) 7.5 MG tablet, Take 1 tablet by mouth 2 (Two) Times a Day., Disp: 60 tablet, Rfl: 3    cyanocobalamin 1000 MCG/ML injection, Inject 1 mL into the appropriate muscle as directed by prescriber Every 30 (Thirty) Days., Disp: 1 mL, Rfl: 8    escitalopram (LEXAPRO) 10 MG tablet, TAKE 1 TABLET BY MOUTH DAILY, Disp: 30 tablet, Rfl: 1    folic acid (FOLVITE) 1 MG tablet, Take 1 tablet by mouth Daily., Disp: 90 tablet, Rfl: 1    gabapentin (Neurontin) 600 MG tablet, Take 1 tablet by mouth 3 (Three) Times a Day., Disp: 270 tablet, Rfl: 1    HYDROcodone-acetaminophen (Norco) 5-325 MG " "per tablet, Take 2 tablets by mouth Every 6 (Six) Hours As Needed for Moderate Pain., Disp: 24 tablet, Rfl: 0    hydrOXYzine (ATARAX) 25 MG tablet, Take 1 tablet by mouth 3 (Three) Times a Day As Needed for Itching or Anxiety., Disp: 90 tablet, Rfl: 0    meloxicam (MOBIC) 15 MG tablet, Take 1 tablet by mouth Daily As Needed (pain)., Disp: 90 tablet, Rfl: 3    methocarbamol (ROBAXIN) 750 MG tablet, Take 1 tablet by mouth., Disp: , Rfl:     pantoprazole (PROTONIX) 40 MG EC tablet, Take 1 tablet by mouth Daily., Disp: 90 tablet, Rfl: 3    promethazine (PHENERGAN) 12.5 MG tablet, Take 1 tablet by mouth Every 8 (Eight) Hours As Needed for Nausea. Uses sparingly, Disp: 24 tablet, Rfl: 2    sucralfate (Carafate) 1 g tablet, Take 1 tablet by mouth 3 (Three) Times a Day., Disp: 90 tablet, Rfl: 4    Syringe 25G X 1\" 3 ML misc, To use with b12 injections, Disp: 25 each, Rfl: 0    tadalafil (Cialis) 5 MG tablet, Take 1 tablet by mouth Daily As Needed for Erectile Dysfunction. Indications: Benign Enlargement of Prostate, Erectile Dysfunction, Disp: 90 tablet, Rfl: 3    tamsulosin (FLOMAX) 0.4 MG capsule 24 hr capsule, Take 1 capsule by mouth Daily., Disp: 30 capsule, Rfl: 5    Current Facility-Administered Medications:     nitroglycerin (NITROSTAT) SL tablet 0.4 mg, 0.4 mg, Sublingual, Q5 Min PRN, Kahlil Mcmanus MD    sodium chloride 0.9 % flush 10 mL, 10 mL, Intravenous, PRN, Kahlil Mcmanus MD     Assessment and Plan:      Assessment and Plan {CC Problem List  Visit Diagnosis  ROS  Review (Popup)  Health Maintenance  Quality  BestPractice  Medications  SmartSets  SnapShot Encounters  Media :23}     Diagnoses and all orders for this visit:    1. Dysuria (Primary)  -     POCT urinalysis dipstick, automated  -     tamsulosin (FLOMAX) 0.4 MG capsule 24 hr capsule; Take 1 capsule by mouth Daily.  Dispense: 30 capsule; Refill: 5  -     Ambulatory Referral to Urology    2. Chronic prostatitis  -     tamsulosin (FLOMAX) 0.4 " MG capsule 24 hr capsule; Take 1 capsule by mouth Daily.  Dispense: 30 capsule; Refill: 5  -     Ambulatory Referral to Urology  -     CBC No Differential    3. BPH with obstruction/lower urinary tract symptoms  -     tamsulosin (FLOMAX) 0.4 MG capsule 24 hr capsule; Take 1 capsule by mouth Daily.  Dispense: 30 capsule; Refill: 5  -     Ambulatory Referral to Urology    4. Other male erectile dysfunction  -     Ambulatory Referral to Urology    5. Dark urine  -     XR Abdomen KUB; Future  -     Comprehensive Metabolic Panel    6. Anemia due to vitamin B12 deficiency, unspecified B12 deficiency type  -     Vitamin B12             New Medications Ordered This Visit   Medications    tamsulosin (FLOMAX) 0.4 MG capsule 24 hr capsule     Sig: Take 1 capsule by mouth Daily.     Dispense:  30 capsule     Refill:  5       Educated patient on dosing and side effects of Flomax.  Educated patient to start taking this as I feel this may be a kidney stone he is experiencing.  Educated patient on results of urine dip.  Will get x-ray of abdomen to look for any stones.  Will get labs and review with patient once available.  Educated patient we will send him to different urologist, and will place this referral today.  Educated patient heavily on not being able to prescribe for medical marijuana.  Educated patient that he will need to reach out to pain management as this could mess up his current treatment plan there.  Educated patient that I am okay with him choosing other holistic or herbal remedies for pain management, but I cannot recommend medical marijuana at this time.  Patient verbalized understanding and is comfortable with the plan of care.    Follow Up {Instructions Charge Capture  Follow-up Communications :23}     No follow-ups on file.      Patient was given instructions and counseling regarding his condition or for health maintenance advice. Please see specific information pulled into the AVS if  appropriate.    Dragon disclaimer:   Much of this encounter note is an electronic transcription/translation of spoken language to printed text. The electronic translation of spoken language may permit erroneous, or at times, nonsensical words or phrases to be inadvertently transcribed; Although I have reviewed the note for such errors, some may still exist.     Additional Patient Counseling:       There are no Patient Instructions on file for this visit.

## 2024-08-14 DIAGNOSIS — F33.2 SEVERE EPISODE OF RECURRENT MAJOR DEPRESSIVE DISORDER, WITHOUT PSYCHOTIC FEATURES: ICD-10-CM

## 2024-08-14 DIAGNOSIS — F41.9 ANXIETY: ICD-10-CM

## 2024-08-15 RX ORDER — HYDROXYZINE HYDROCHLORIDE 25 MG/1
25 TABLET, FILM COATED ORAL 3 TIMES DAILY PRN
Qty: 90 TABLET | Refills: 1 | Status: SHIPPED | OUTPATIENT
Start: 2024-08-15

## 2024-08-15 NOTE — TELEPHONE ENCOUNTER
Rx Refill Note  Requested Prescriptions     Pending Prescriptions Disp Refills    hydrOXYzine (ATARAX) 25 MG tablet 90 tablet 0     Sig: Take 1 tablet by mouth 3 (Three) Times a Day As Needed for Itching or Anxiety.      Last office visit with prescribing clinician: 7/24/2024   Last telemedicine visit with prescribing clinician: Visit date not found   Next office visit with prescribing clinician: 9/18/2024                         Would you like a call back once the refill request has been completed: [] Yes [] No    If the office needs to give you a call back, can they leave a voicemail: [] Yes [] No    Bari Ga MA  08/15/24, 08:52 EDT

## 2024-09-18 ENCOUNTER — OFFICE VISIT (OUTPATIENT)
Dept: INTERNAL MEDICINE | Facility: CLINIC | Age: 59
End: 2024-09-18
Payer: MEDICARE

## 2024-09-18 VITALS
WEIGHT: 143.2 LBS | DIASTOLIC BLOOD PRESSURE: 65 MMHG | SYSTOLIC BLOOD PRESSURE: 130 MMHG | HEIGHT: 66 IN | BODY MASS INDEX: 23.01 KG/M2 | OXYGEN SATURATION: 97 % | HEART RATE: 50 BPM | TEMPERATURE: 97.9 F

## 2024-09-18 DIAGNOSIS — Z12.5 PROSTATE CANCER SCREENING: ICD-10-CM

## 2024-09-18 DIAGNOSIS — R73.09 ABNORMAL GLUCOSE: ICD-10-CM

## 2024-09-18 DIAGNOSIS — M47.816 FACET ARTHRITIS OF LUMBAR REGION: ICD-10-CM

## 2024-09-18 DIAGNOSIS — N40.1 BENIGN PROSTATIC HYPERPLASIA WITH LOWER URINARY TRACT SYMPTOMS, SYMPTOM DETAILS UNSPECIFIED: ICD-10-CM

## 2024-09-18 DIAGNOSIS — Z00.00 ROUTINE GENERAL MEDICAL EXAMINATION AT A HEALTH CARE FACILITY: Primary | ICD-10-CM

## 2024-09-18 DIAGNOSIS — I10 ESSENTIAL HYPERTENSION: ICD-10-CM

## 2024-09-18 DIAGNOSIS — F41.9 ANXIETY: ICD-10-CM

## 2024-09-18 DIAGNOSIS — E78.5 DYSLIPIDEMIA: ICD-10-CM

## 2024-09-18 DIAGNOSIS — F33.1 MAJOR DEPRESSIVE DISORDER, RECURRENT, MODERATE: ICD-10-CM

## 2024-09-18 DIAGNOSIS — Z91.89 AT RISK FOR CORONARY ARTERY DISEASE: ICD-10-CM

## 2024-09-18 DIAGNOSIS — Z13.6 HYPERTENSION SCREEN: ICD-10-CM

## 2024-09-18 RX ORDER — ZOLPIDEM TARTRATE 5 MG/1
1 TABLET ORAL DAILY
COMMUNITY
Start: 2024-09-03

## 2024-09-18 RX ORDER — ESCITALOPRAM OXALATE 20 MG/1
20 TABLET ORAL DAILY
Qty: 90 TABLET | Refills: 1 | Status: SHIPPED | OUTPATIENT
Start: 2024-09-18

## 2024-09-18 RX ORDER — BUSPIRONE HYDROCHLORIDE 10 MG/1
10 TABLET ORAL 2 TIMES DAILY
Qty: 60 TABLET | Refills: 5 | Status: SHIPPED | OUTPATIENT
Start: 2024-09-18

## 2024-09-18 NOTE — PROGRESS NOTES
"        Chief Complaint  Annual Exam, Hyperlipidemia, Hypertension, Depression, Back Pain, and Benign Prostatic Hypertrophy     Subjective:      History of Present Illness {CC  Problem List  Visit  Diagnosis   Encounters  Notes  Medications  Labs  Result Review Imaging  Media :23}     [facility] presents to [facility] for:      History of Present Illness     Low back pain with sciatica: pain management - Dr. Perez              -pt saw pain management 8/30/2024     HLD and HTN: pt has changed his eating habits and states he eats relatively well.      Depression:  lexapro              - was previously on buspar, but states that he is not anymore as he ran out. . Pt states he has been having increased anxiety.        BPH- Urology              - flomax, cialis    - pt is not having any trouble urinating at this time.     Gave pt handicap paper for his back.     [facility] is here for coordination of medical care, to discuss health maintenance, disease prevention as well as to follow up on medical problems.     Patient Care Team:  Nery Herr APRN as PCP - General (Nurse Practitioner)      He states that his activity level is rarely.   Exercise:     His diet is in general, an \"unhealthy\" diet.       Health and Weight:   Weight trend is   Wt Readings from Last 4 Encounters:   09/18/24 65 kg (143 lb 3.2 oz)   07/24/24 64.3 kg (141 lb 12.8 oz)   05/01/24 62.6 kg (138 lb)   04/10/24 65 kg (143 lb 6.4 oz)       BMI is within normal parameters. No other follow-up for BMI required.      Mental Health Check:   Depression screen: PHQ-2 Total Score: 25     Blood Pressure:   BP Readings from Last 3 Encounters:   09/18/24 130/65   07/24/24 116/72   05/01/24 130/62        Cholesterol Screen:   Most men start screen at 35, if you have family history or comorbidities it may be screen earlier.     Risk Evaluation:  1. Cardiovascular risk factors: hyperlipidemia, family history of CAD, " male gender, sedentary life style, age.  2. Diabetes risk factors: FH of diabetes and sedentary life style.   3. Cancer risk factors: FH of prostate cancer and h/o tocacco smoking.    Prevention:   Cholesterol and glucose screen due.   Hepatitis  C screen: [x] Due  [] Completed :     Colon Cancer Screen:   He has no change in bowel habits.   Patient's last colonoscopy was 2/2020.   He is advised to repeat in 10 years.  Family history: [x] None    [] Yes:     Genital/ Urinary Health:   He voids without difficulty.  He is sexually active.      STI Screen:   [] HIV     [] Syphilis   [] Chlamydia/ Gonorrhea   [x] Declines STD screen  If tests ordered, patient was informed HIV results will not show up on my chart.     Testicular cancer Screen:   Testicular self exams recommended once a month.   Advised that any firm testicular nodules to be reported immediately.    Prostate cancer screening:    Lab Results   Component Value Date    PSA 0.453 09/18/2024    PSA 0.6 05/25/2023    PSA 0.39 01/20/2020      Family history: [] None    [] Yes:     Lung Cancer Screen:   [] Nonsmoker  [x] History of smoking  []     Tobacco Use: Medium Risk (9/18/2024)    Patient History     Smoking Tobacco Use: Former     Smokeless Tobacco Use: Never     Passive Exposure: Not on file        Abdominal Aortic Aneurysm:   Age between 65 - 74 years of age that has ever smoked.   [x] N/A       [] Advised     Vaccines Due:   [] Prevnar 20     [] Flu  [] Shingrix (shingles: series of 2)   []   [] COVID (booster)     [] Declines vaccines       Eye exams: advise routine and for any vision changes.   Always where sunglass when outside with UV protection.     Advise routine dental visits for oral health.     Skin Cancer:   Advise daily use of sunscreen.   Routine self assessment of your skin, report any changes.                 I have reviewed patient's medical history, any new submitted information provided by patient or medical assistant and updated  "medical record.      Objective:      Physical Exam  Vitals reviewed.   Constitutional:       Appearance: Normal appearance. He is not ill-appearing or toxic-appearing.   HENT:      Head: Normocephalic.   Cardiovascular:      Rate and Rhythm: Normal rate and regular rhythm.      Pulses: Normal pulses.      Heart sounds: No murmur heard.  Pulmonary:      Effort: Pulmonary effort is normal. No respiratory distress.      Breath sounds: Normal breath sounds. No stridor. No wheezing, rhonchi or rales.   Chest:      Chest wall: No tenderness.   Abdominal:      General: Abdomen is flat. Bowel sounds are normal.      Palpations: Abdomen is soft.   Musculoskeletal:      Cervical back: Normal range of motion.   Skin:     General: Skin is warm and dry.      Capillary Refill: Capillary refill takes less than 2 seconds.   Neurological:      General: No focal deficit present.      Mental Status: He is alert.   Psychiatric:         Attention and Perception: He does not perceive visual hallucinations.         Mood and Affect: Mood is depressed. Affect is tearful.         Behavior: Behavior is agitated.         Thought Content: Thought content does not include homicidal or suicidal ideation. Thought content does not include homicidal or suicidal plan.         Judgment: Judgment is not impulsive or inappropriate.        Result Review  Data Reviewed:{ Labs  Result Review  Imaging  Med Tab  Media :23}                Vital Signs:   /65 (BP Location: Left arm, Patient Position: Sitting, Cuff Size: Adult)   Pulse 50   Temp 97.9 °F (36.6 °C) (Oral)   Ht 167.6 cm (66\")   Wt 65 kg (143 lb 3.2 oz)   SpO2 97%   BMI 23.11 kg/m²   Estimated body mass index is 23.11 kg/m² as calculated from the following:    Height as of this encounter: 167.6 cm (66\").    Weight as of this encounter: 65 kg (143 lb 3.2 oz).        Requested Prescriptions     Signed Prescriptions Disp Refills    escitalopram (Lexapro) 20 MG tablet 90 tablet 1     " "Sig: Take 1 tablet by mouth Daily.    busPIRone (BUSPAR) 10 MG tablet 60 tablet 5     Sig: Take 1 tablet by mouth 2 (Two) Times a Day.       Routine medications provided by this office will also be refilled via pharmacy request.     Educated patient on dosing and side effects of Lexapro and BuSpar.  Educated patient that he needs to take these as prescribed and they will help him with his depression and anxiety.  Current Outpatient Medications:     folic acid (FOLVITE) 1 MG tablet, Take 1 tablet by mouth Daily., Disp: 90 tablet, Rfl: 1    gabapentin (Neurontin) 600 MG tablet, Take 1 tablet by mouth 3 (Three) Times a Day., Disp: 270 tablet, Rfl: 1    HYDROcodone-acetaminophen (Norco) 5-325 MG per tablet, Take 2 tablets by mouth Every 6 (Six) Hours As Needed for Moderate Pain., Disp: 24 tablet, Rfl: 0    hydrOXYzine (ATARAX) 25 MG tablet, Take 1 tablet by mouth 3 (Three) Times a Day As Needed for Itching or Anxiety., Disp: 90 tablet, Rfl: 1    meloxicam (MOBIC) 15 MG tablet, Take 1 tablet by mouth Daily As Needed (pain)., Disp: 90 tablet, Rfl: 3    methocarbamol (ROBAXIN) 750 MG tablet, Take 1 tablet by mouth., Disp: , Rfl:     pantoprazole (PROTONIX) 40 MG EC tablet, Take 1 tablet by mouth Daily., Disp: 90 tablet, Rfl: 3    promethazine (PHENERGAN) 12.5 MG tablet, Take 1 tablet by mouth Every 8 (Eight) Hours As Needed for Nausea. Uses sparingly, Disp: 24 tablet, Rfl: 2    sucralfate (Carafate) 1 g tablet, Take 1 tablet by mouth 3 (Three) Times a Day., Disp: 90 tablet, Rfl: 4    tadalafil (Cialis) 5 MG tablet, Take 1 tablet by mouth Daily As Needed for Erectile Dysfunction. Indications: Benign Enlargement of Prostate, Erectile Dysfunction, Disp: 90 tablet, Rfl: 3    tamsulosin (FLOMAX) 0.4 MG capsule 24 hr capsule, Take 1 capsule by mouth Daily., Disp: 30 capsule, Rfl: 5    zolpidem (AMBIEN) 5 MG tablet, Take 1 tablet by mouth Daily., Disp: , Rfl:     B-D INSULIN SYRINGE 1CC/25GX1\" 25G X 1\" 1 ML misc, USE WITH B12 " "INJECTIONS (Patient not taking: Reported on 9/18/2024), Disp: , Rfl:     busPIRone (BUSPAR) 10 MG tablet, Take 1 tablet by mouth 2 (Two) Times a Day., Disp: 60 tablet, Rfl: 5    cyanocobalamin 1000 MCG/ML injection, Inject 1 mL into the appropriate muscle as directed by prescriber Every 30 (Thirty) Days. (Patient not taking: Reported on 9/18/2024), Disp: 1 mL, Rfl: 8    escitalopram (Lexapro) 20 MG tablet, Take 1 tablet by mouth Daily., Disp: 90 tablet, Rfl: 1    Syringe 25G X 1\" 3 ML misc, To use with b12 injections (Patient not taking: Reported on 9/18/2024), Disp: 25 each, Rfl: 0    Current Facility-Administered Medications:     nitroglycerin (NITROSTAT) SL tablet 0.4 mg, 0.4 mg, Sublingual, Q5 Min PRN, Kahlil Mcmanus MD    sodium chloride 0.9 % flush 10 mL, 10 mL, Intravenous, PRN, Kahlil Mcmanus MD     Assessment and Plan:      Assessment and Plan {CC Problem List  Visit Diagnosis  ROS  Review (Popup)  Health Maintenance  Quality  BestPractice  Medications  SmartSets  SnapShot Encounters  Media :23}     Diagnoses and all orders for this visit:    1. Routine general medical examination at a health care facility (Primary)    2. Anxiety    3. At risk for coronary artery disease    4. Hypertension screen    5. Prostate cancer screening  -     PSA SCREENING    6. Dyslipidemia  -     Lipid Panel  -     CBC & Differential  -     Comprehensive Metabolic Panel    7. Essential hypertension  Overview:  Formatting of this note might be different from the original.  DX 2018 and [facility] started    Orders:  -     Lipid Panel  -     CBC & Differential  -     Comprehensive Metabolic Panel  -     Hemoglobin A1c    8. Abnormal glucose  -     Hemoglobin A1c    9. Facet arthritis of lumbar region    10. Benign prostatic hyperplasia with lower urinary tract symptoms, symptom details unspecified  Overview:  7/13/2023-[facility]      11. Major depressive disorder, recurrent, moderate    Other " orders  -     escitalopram (Lexapro) 20 MG tablet; Take 1 tablet by mouth Daily.  Dispense: 90 tablet; Refill: 1  -     busPIRone (BUSPAR) 10 MG tablet; Take 1 tablet by mouth 2 (Two) Times a Day.  Dispense: 60 tablet; Refill: 5             New Medications Ordered This Visit   Medications    escitalopram (Lexapro) 20 MG tablet     Sig: Take 1 tablet by mouth Daily.     Dispense:  90 tablet     Refill:  1    busPIRone (BUSPAR) 10 MG tablet     Sig: Take 1 tablet by mouth 2 (Two) Times a Day.     Dispense:  60 tablet     Refill:  5       Educated patient on dosing and side effects of Lexapro and BuSpar.  Educated patient to take as prescribed as this will help his anxiety depression.  Educated patient that since he is not currently working, and he feels his mental health is sliding that I believe he should start spending time outside.  Educated patient to take at least 1 walk a day with his dog.  Will get labs and review with patient once available.  Patient verbalized understanding and is comfortable with the plan of care.    Follow Up {Instructions Charge Capture  Follow-up Communications :23}     Return in about 6 months (around 3/18/2025) for Recheck.      Patient was given instructions and counseling regarding his condition or for health maintenance advice. Please see specific information pulled into the AVS if appropriate.    Bailey disclaimer:   Much of this encounter note is an electronic transcription/translation of spoken language to printed text. The electronic translation of spoken language may permit erroneous, or at times, nonsensical words or phrases to be inadvertently transcribed; Although I have reviewed the note for such errors, some may still exist.     Additional Patient Counseling:       There are no Patient Instructions on file for this visit.

## 2024-09-19 LAB
ALBUMIN SERPL-MCNC: 4.2 G/DL (ref 3.5–5.2)
ALBUMIN/GLOB SERPL: 1.6 G/DL
ALP SERPL-CCNC: 57 U/L (ref 39–117)
ALT SERPL-CCNC: 9 U/L (ref 1–41)
AST SERPL-CCNC: 13 U/L (ref 1–40)
BASOPHILS # BLD AUTO: 0.11 10*3/MM3 (ref 0–0.2)
BASOPHILS NFR BLD AUTO: 1.7 % (ref 0–1.5)
BILIRUB SERPL-MCNC: 0.3 MG/DL (ref 0–1.2)
BUN SERPL-MCNC: 9 MG/DL (ref 8–23)
BUN/CREAT SERPL: 8.6 (ref 7–25)
CALCIUM SERPL-MCNC: 9 MG/DL (ref 8.6–10.5)
CHLORIDE SERPL-SCNC: 102 MMOL/L (ref 98–107)
CHOLEST SERPL-MCNC: 172 MG/DL (ref 0–200)
CO2 SERPL-SCNC: 29.6 MMOL/L (ref 22–29)
CREAT SERPL-MCNC: 1.05 MG/DL (ref 0.76–1.27)
EGFRCR SERPLBLD CKD-EPI 2021: 81.3 ML/MIN/1.73
EOSINOPHIL # BLD AUTO: 0.41 10*3/MM3 (ref 0–0.4)
EOSINOPHIL NFR BLD AUTO: 6.2 % (ref 0.3–6.2)
ERYTHROCYTE [DISTWIDTH] IN BLOOD BY AUTOMATED COUNT: 13.4 % (ref 12.3–15.4)
GLOBULIN SER CALC-MCNC: 2.6 GM/DL
GLUCOSE SERPL-MCNC: 90 MG/DL (ref 65–99)
HBA1C MFR BLD: 5.1 % (ref 4.8–5.6)
HCT VFR BLD AUTO: 39.6 % (ref 37.5–51)
HDLC SERPL-MCNC: 51 MG/DL (ref 40–60)
HGB BLD-MCNC: 12.5 G/DL (ref 13–17.7)
IMM GRANULOCYTES # BLD AUTO: 0.02 10*3/MM3 (ref 0–0.05)
IMM GRANULOCYTES NFR BLD AUTO: 0.3 % (ref 0–0.5)
LDLC SERPL CALC-MCNC: 99 MG/DL (ref 0–100)
LYMPHOCYTES # BLD AUTO: 2.67 10*3/MM3 (ref 0.7–3.1)
LYMPHOCYTES NFR BLD AUTO: 40.3 % (ref 19.6–45.3)
MCH RBC QN AUTO: 31.9 PG (ref 26.6–33)
MCHC RBC AUTO-ENTMCNC: 31.6 G/DL (ref 31.5–35.7)
MCV RBC AUTO: 101 FL (ref 79–97)
MONOCYTES # BLD AUTO: 0.59 10*3/MM3 (ref 0.1–0.9)
MONOCYTES NFR BLD AUTO: 8.9 % (ref 5–12)
NEUTROPHILS # BLD AUTO: 2.82 10*3/MM3 (ref 1.7–7)
NEUTROPHILS NFR BLD AUTO: 42.6 % (ref 42.7–76)
NRBC BLD AUTO-RTO: 0 /100 WBC (ref 0–0.2)
PLATELET # BLD AUTO: 236 10*3/MM3 (ref 140–450)
POTASSIUM SERPL-SCNC: 5.5 MMOL/L (ref 3.5–5.2)
PROT SERPL-MCNC: 6.8 G/DL (ref 6–8.5)
PSA SERPL-MCNC: 0.45 NG/ML (ref 0–4)
RBC # BLD AUTO: 3.92 10*6/MM3 (ref 4.14–5.8)
SODIUM SERPL-SCNC: 138 MMOL/L (ref 136–145)
TRIGL SERPL-MCNC: 122 MG/DL (ref 0–150)
VLDLC SERPL CALC-MCNC: 22 MG/DL (ref 5–40)
WBC # BLD AUTO: 6.62 10*3/MM3 (ref 3.4–10.8)

## 2024-10-08 PROBLEM — F33.1 MAJOR DEPRESSIVE DISORDER, RECURRENT, MODERATE: Status: ACTIVE | Noted: 2024-10-08

## 2024-10-17 ENCOUNTER — TELEPHONE (OUTPATIENT)
Dept: INTERNAL MEDICINE | Facility: CLINIC | Age: 59
End: 2024-10-17
Payer: COMMERCIAL

## 2024-10-17 DIAGNOSIS — M47.816 FACET ARTHRITIS OF LUMBAR REGION: Primary | ICD-10-CM

## 2024-10-17 DIAGNOSIS — M54.2 CHRONIC NECK PAIN: ICD-10-CM

## 2024-10-17 DIAGNOSIS — M54.41 CHRONIC BILATERAL LOW BACK PAIN WITH BILATERAL SCIATICA: ICD-10-CM

## 2024-10-17 DIAGNOSIS — G89.29 CHRONIC BILATERAL LOW BACK PAIN WITH BILATERAL SCIATICA: ICD-10-CM

## 2024-10-17 DIAGNOSIS — G89.29 CHRONIC NECK PAIN: ICD-10-CM

## 2024-10-17 DIAGNOSIS — G89.29 CHRONIC MIDLINE LOW BACK PAIN, UNSPECIFIED WHETHER SCIATICA PRESENT: ICD-10-CM

## 2024-10-17 DIAGNOSIS — M50.20 CERVICAL DISC HERNIATION: ICD-10-CM

## 2024-10-17 DIAGNOSIS — M54.42 CHRONIC BILATERAL LOW BACK PAIN WITH BILATERAL SCIATICA: ICD-10-CM

## 2024-10-17 DIAGNOSIS — M54.50 CHRONIC MIDLINE LOW BACK PAIN, UNSPECIFIED WHETHER SCIATICA PRESENT: ICD-10-CM

## 2024-10-17 NOTE — TELEPHONE ENCOUNTER
Caller: [facility] Grier    Relationship: Self    Best call back number: [phone]     What is the medical concern/diagnosis: NECK, BACK AND KNEES    What specialty or service is being requested: PAIN MANAGEMENT    What is the provider, practice or medical service name: [facility]    What is the office location:[facility]        Any additional details: CURRENT PAIN MANAGEMENT WENT OUT OF BUSINESS.

## 2025-07-30 ENCOUNTER — TELEPHONE (OUTPATIENT)
Dept: INTERNAL MEDICINE | Facility: CLINIC | Age: 60
End: 2025-07-30

## 2025-07-30 NOTE — TELEPHONE ENCOUNTER
Caller: HUGO        Best call back number:     What was the call regarding: HUGO  CALLING WANTED TO SPEAK WITH SOMEONE ABOUT BILLING FOR  09/18/2024 WAS THE DAY OF THE LAB    NEEDING INSURANCE INFORMATION     PLEASE GIVE CALLBACK       REF [phone]

## 2025-07-30 NOTE — TELEPHONE ENCOUNTER
Called [facility] billing, spoke to Hallie.  They are showing insurance is not in effect, have a bad address and have sent 15 bills.  I told her that we give the [facility] phlebotomists the correct information at the time of service.

## (undated) DEVICE — Device

## (undated) DEVICE — TOWEL,OR,DSP,ST,BLUE,STD,4/PK,20PK/CS: Brand: MEDLINE

## (undated) DEVICE — SOL IRRG H2O BG 3000ML STRL

## (undated) DEVICE — CYSTO/BLADDER IRRIGATION SET, REGULATING CLAMP

## (undated) DEVICE — SKIN PREP TRAY W/CHG: Brand: MEDLINE INDUSTRIES, INC.

## (undated) DEVICE — STERILE POLYISOPRENE POWDER-FREE SURGICAL GLOVES: Brand: PROTEXIS

## (undated) DEVICE — CYSTO PACK: Brand: MEDLINE INDUSTRIES, INC.